# Patient Record
Sex: FEMALE | Race: WHITE | Employment: FULL TIME | URBAN - METROPOLITAN AREA
[De-identification: names, ages, dates, MRNs, and addresses within clinical notes are randomized per-mention and may not be internally consistent; named-entity substitution may affect disease eponyms.]

---

## 2018-04-24 ENCOUNTER — HOSPITAL ENCOUNTER (EMERGENCY)
Age: 38
Discharge: HOME | End: 2018-04-24
Attending: EMERGENCY MEDICINE
Payer: COMMERCIAL

## 2018-04-24 VITALS
BODY MASS INDEX: 31.55 KG/M2 | OXYGEN SATURATION: 98 % | HEART RATE: 110 BPM | WEIGHT: 213 LBS | HEIGHT: 69 IN | TEMPERATURE: 97.5 F | RESPIRATION RATE: 20 BRPM | DIASTOLIC BLOOD PRESSURE: 98 MMHG | SYSTOLIC BLOOD PRESSURE: 149 MMHG

## 2018-04-24 DIAGNOSIS — S39.012A STRAIN OF LUMBAR REGION, INITIAL ENCOUNTER: Primary | ICD-10-CM

## 2018-04-24 DIAGNOSIS — Z32.01 PREGNANCY TEST POSITIVE: ICD-10-CM

## 2018-04-24 LAB
-: ABNORMAL
AMORPHOUS: ABNORMAL
BACTERIA: ABNORMAL
BILIRUBIN URINE: NEGATIVE
CASTS UA: ABNORMAL /LPF (ref 0–8)
COLOR: YELLOW
CRYSTALS, UA: ABNORMAL /HPF
EPITHELIAL CELLS UA: ABNORMAL /HPF (ref 0–5)
GLUCOSE URINE: NEGATIVE
HCG(URINE) PREGNANCY TEST: POSITIVE
KETONES, URINE: ABNORMAL
LEUKOCYTE ESTERASE, URINE: ABNORMAL
MUCUS: ABNORMAL
NITRITE, URINE: NEGATIVE
OTHER OBSERVATIONS UA: ABNORMAL
PH UA: 5 (ref 5–8)
PROTEIN UA: NEGATIVE
RBC UA: ABNORMAL /HPF (ref 0–4)
RENAL EPITHELIAL, UA: ABNORMAL /HPF
SPECIFIC GRAVITY UA: 1.03 (ref 1–1.03)
TRICHOMONAS: ABNORMAL
TURBIDITY: ABNORMAL
URINE HGB: NEGATIVE
UROBILINOGEN, URINE: NORMAL
WBC UA: ABNORMAL /HPF (ref 0–5)
YEAST: ABNORMAL

## 2018-04-24 PROCEDURE — 81001 URINALYSIS AUTO W/SCOPE: CPT

## 2018-04-24 PROCEDURE — 99283 EMERGENCY DEPT VISIT LOW MDM: CPT

## 2018-04-24 PROCEDURE — 6370000000 HC RX 637 (ALT 250 FOR IP): Performed by: EMERGENCY MEDICINE

## 2018-04-24 PROCEDURE — 84703 CHORIONIC GONADOTROPIN ASSAY: CPT

## 2018-04-24 RX ORDER — NITROFURANTOIN 25; 75 MG/1; MG/1
100 CAPSULE ORAL ONCE
Status: COMPLETED | OUTPATIENT
Start: 2018-04-24 | End: 2018-04-24

## 2018-04-24 RX ORDER — FOLIC ACID, .BETA.-CAROTENE, ASCORBIC ACID, CHOLECALCIFEROL, .ALPHA.-TOCOPHEROL ACETATE, DL-, THIAMINE MONONITRATE, RIBOFLAVIN, NIACINAMIDE, PYRIDOXINE HYDROCHLORIDE, CYANOCOBALAMIN, CALCIUM PANTOTHENATE, CALCIUM CARBONATE, FERROUS FUMARATE, AND ZINC OXIDE 1; 1000; 100; 400; 30; 3; 3; 15; 20; 12; 7; 200; 29; 20 MG/1; [IU]/1; MG/1; [IU]/1; [IU]/1; MG/1; MG/1; MG/1; MG/1; UG/1; MG/1; MG/1; MG/1; MG/1
1 TABLET, CHEWABLE ORAL DAILY
Qty: 30 TABLET | Refills: 3 | Status: ON HOLD | OUTPATIENT
Start: 2018-04-24 | End: 2021-02-08

## 2018-04-24 RX ORDER — NITROFURANTOIN 25; 75 MG/1; MG/1
100 CAPSULE ORAL 2 TIMES DAILY
Qty: 10 CAPSULE | Refills: 0 | Status: SHIPPED | OUTPATIENT
Start: 2018-04-24 | End: 2018-04-29

## 2018-04-24 RX ADMIN — NITROFURANTOIN MONOHYDRATE AND NITROFURANTOIN MACROCRYSTALLINE 100 MG: 75; 25 CAPSULE ORAL at 22:29

## 2018-04-24 ASSESSMENT — PAIN DESCRIPTION - ORIENTATION: ORIENTATION: LEFT;LOWER

## 2018-04-24 ASSESSMENT — PAIN DESCRIPTION - DESCRIPTORS: DESCRIPTORS: DULL

## 2018-04-24 ASSESSMENT — PAIN SCALES - GENERAL: PAINLEVEL_OUTOF10: 6

## 2018-04-24 ASSESSMENT — PAIN DESCRIPTION - LOCATION: LOCATION: BACK

## 2018-07-03 LAB — PAP SMEAR: NORMAL

## 2019-02-12 ENCOUNTER — HOSPITAL ENCOUNTER (EMERGENCY)
Age: 39
Discharge: HOME | End: 2019-02-12
Attending: EMERGENCY MEDICINE
Payer: COMMERCIAL

## 2019-02-12 VITALS
WEIGHT: 224 LBS | OXYGEN SATURATION: 96 % | BODY MASS INDEX: 32.07 KG/M2 | RESPIRATION RATE: 16 BRPM | HEIGHT: 70 IN | HEART RATE: 118 BPM | SYSTOLIC BLOOD PRESSURE: 128 MMHG | TEMPERATURE: 98 F | DIASTOLIC BLOOD PRESSURE: 83 MMHG

## 2019-02-12 DIAGNOSIS — S39.012A STRAIN OF LUMBAR REGION, INITIAL ENCOUNTER: Primary | ICD-10-CM

## 2019-02-12 PROCEDURE — 96372 THER/PROPH/DIAG INJ SC/IM: CPT

## 2019-02-12 PROCEDURE — 99283 EMERGENCY DEPT VISIT LOW MDM: CPT

## 2019-02-12 PROCEDURE — 6360000002 HC RX W HCPCS: Performed by: PHYSICIAN ASSISTANT

## 2019-02-12 RX ORDER — KETOROLAC TROMETHAMINE 30 MG/ML
60 INJECTION, SOLUTION INTRAMUSCULAR; INTRAVENOUS ONCE
Status: COMPLETED | OUTPATIENT
Start: 2019-02-12 | End: 2019-02-12

## 2019-02-12 RX ORDER — TRAMADOL HYDROCHLORIDE 50 MG/1
50 TABLET ORAL EVERY 8 HOURS PRN
Qty: 9 TABLET | Refills: 0 | Status: SHIPPED | OUTPATIENT
Start: 2019-02-12 | End: 2019-02-15

## 2019-02-12 RX ORDER — PREDNISONE 20 MG/1
40 TABLET ORAL DAILY
Qty: 10 TABLET | Refills: 0 | Status: SHIPPED | OUTPATIENT
Start: 2019-02-12 | End: 2019-02-17

## 2019-02-12 RX ORDER — CYCLOBENZAPRINE HCL 10 MG
10 TABLET ORAL 2 TIMES DAILY PRN
Qty: 10 TABLET | Refills: 0 | Status: SHIPPED | OUTPATIENT
Start: 2019-02-12 | End: 2019-02-17

## 2019-02-12 RX ORDER — ORPHENADRINE CITRATE 30 MG/ML
60 INJECTION INTRAMUSCULAR; INTRAVENOUS ONCE
Status: COMPLETED | OUTPATIENT
Start: 2019-02-12 | End: 2019-02-12

## 2019-02-12 RX ADMIN — ORPHENADRINE CITRATE 60 MG: 30 INJECTION INTRAMUSCULAR; INTRAVENOUS at 19:02

## 2019-02-12 RX ADMIN — KETOROLAC TROMETHAMINE 60 MG: 30 INJECTION, SOLUTION INTRAMUSCULAR; INTRAVENOUS at 19:01

## 2019-02-12 ASSESSMENT — ENCOUNTER SYMPTOMS
BACK PAIN: 1
ABDOMINAL SWELLING: 0

## 2019-02-12 ASSESSMENT — PAIN DESCRIPTION - PAIN TYPE
TYPE: ACUTE PAIN
TYPE: ACUTE PAIN

## 2019-02-12 ASSESSMENT — PAIN DESCRIPTION - ORIENTATION
ORIENTATION: LOWER
ORIENTATION: LEFT

## 2019-02-12 ASSESSMENT — PAIN DESCRIPTION - LOCATION
LOCATION: BACK
LOCATION: BACK

## 2019-02-12 ASSESSMENT — PAIN DESCRIPTION - DESCRIPTORS: DESCRIPTORS: ACHING

## 2019-02-12 ASSESSMENT — PAIN SCALES - GENERAL
PAINLEVEL_OUTOF10: 8
PAINLEVEL_OUTOF10: 5
PAINLEVEL_OUTOF10: 8

## 2019-02-12 ASSESSMENT — PAIN DESCRIPTION - FREQUENCY: FREQUENCY: CONTINUOUS

## 2019-02-12 ASSESSMENT — PAIN DESCRIPTION - PROGRESSION: CLINICAL_PROGRESSION: GRADUALLY IMPROVING

## 2019-03-05 ENCOUNTER — HOSPITAL ENCOUNTER (OUTPATIENT)
Dept: GENERAL RADIOLOGY | Age: 39
Discharge: HOME | End: 2019-03-07
Payer: COMMERCIAL

## 2019-03-05 ENCOUNTER — HOSPITAL ENCOUNTER (OUTPATIENT)
Age: 39
Discharge: HOME | End: 2019-03-07
Payer: COMMERCIAL

## 2019-03-05 ENCOUNTER — OFFICE VISIT (OUTPATIENT)
Dept: PRIMARY CARE CLINIC | Age: 39
End: 2019-03-05
Payer: COMMERCIAL

## 2019-03-05 VITALS
TEMPERATURE: 98.2 F | DIASTOLIC BLOOD PRESSURE: 95 MMHG | OXYGEN SATURATION: 99 % | SYSTOLIC BLOOD PRESSURE: 142 MMHG | BODY MASS INDEX: 32.83 KG/M2 | HEART RATE: 84 BPM | WEIGHT: 228.8 LBS

## 2019-03-05 DIAGNOSIS — M54.50 CHRONIC MIDLINE LOW BACK PAIN WITHOUT SCIATICA: ICD-10-CM

## 2019-03-05 DIAGNOSIS — R11.10 DRY HEAVES: ICD-10-CM

## 2019-03-05 DIAGNOSIS — M54.50 CHRONIC MIDLINE LOW BACK PAIN WITHOUT SCIATICA: Primary | ICD-10-CM

## 2019-03-05 DIAGNOSIS — Z78.9: ICD-10-CM

## 2019-03-05 DIAGNOSIS — Z01.84 IMMUNITY STATUS TESTING: ICD-10-CM

## 2019-03-05 DIAGNOSIS — Z71.6 TOBACCO ABUSE COUNSELING: ICD-10-CM

## 2019-03-05 DIAGNOSIS — G89.29 CHRONIC MIDLINE LOW BACK PAIN WITHOUT SCIATICA: ICD-10-CM

## 2019-03-05 DIAGNOSIS — Z72.0 TOBACCO USE: ICD-10-CM

## 2019-03-05 DIAGNOSIS — E66.09 CLASS 1 OBESITY DUE TO EXCESS CALORIES WITH BODY MASS INDEX (BMI) OF 32.0 TO 32.9 IN ADULT, UNSPECIFIED WHETHER SERIOUS COMORBIDITY PRESENT: ICD-10-CM

## 2019-03-05 DIAGNOSIS — Z76.89 ENCOUNTER TO ESTABLISH CARE: ICD-10-CM

## 2019-03-05 DIAGNOSIS — G89.29 CHRONIC MIDLINE LOW BACK PAIN WITHOUT SCIATICA: Primary | ICD-10-CM

## 2019-03-05 PROCEDURE — G8417 CALC BMI ABV UP PARAM F/U: HCPCS | Performed by: PHYSICIAN ASSISTANT

## 2019-03-05 PROCEDURE — 72100 X-RAY EXAM L-S SPINE 2/3 VWS: CPT

## 2019-03-05 PROCEDURE — G8484 FLU IMMUNIZE NO ADMIN: HCPCS | Performed by: PHYSICIAN ASSISTANT

## 2019-03-05 PROCEDURE — 4004F PT TOBACCO SCREEN RCVD TLK: CPT | Performed by: PHYSICIAN ASSISTANT

## 2019-03-05 PROCEDURE — G8427 DOCREV CUR MEDS BY ELIG CLIN: HCPCS | Performed by: PHYSICIAN ASSISTANT

## 2019-03-05 PROCEDURE — 99214 OFFICE O/P EST MOD 30 MIN: CPT | Performed by: PHYSICIAN ASSISTANT

## 2019-03-05 SDOH — HEALTH STABILITY: MENTAL HEALTH: HOW OFTEN DO YOU HAVE A DRINK CONTAINING ALCOHOL?: 2-4 TIMES A MONTH

## 2019-03-05 ASSESSMENT — ENCOUNTER SYMPTOMS
VOMITING: 0
BACK PAIN: 1
NAUSEA: 0

## 2019-03-05 ASSESSMENT — PATIENT HEALTH QUESTIONNAIRE - PHQ9
SUM OF ALL RESPONSES TO PHQ QUESTIONS 1-9: 0
SUM OF ALL RESPONSES TO PHQ9 QUESTIONS 1 & 2: 0
SUM OF ALL RESPONSES TO PHQ QUESTIONS 1-9: 0
2. FEELING DOWN, DEPRESSED OR HOPELESS: 0
1. LITTLE INTEREST OR PLEASURE IN DOING THINGS: 0

## 2019-03-12 ENCOUNTER — HOSPITAL ENCOUNTER (OUTPATIENT)
Dept: PHYSICAL THERAPY | Age: 39
Setting detail: THERAPIES SERIES
Discharge: HOME | End: 2019-03-12
Payer: COMMERCIAL

## 2019-03-12 PROCEDURE — 97530 THERAPEUTIC ACTIVITIES: CPT

## 2019-03-12 PROCEDURE — 97110 THERAPEUTIC EXERCISES: CPT

## 2019-03-12 PROCEDURE — 97161 PT EVAL LOW COMPLEX 20 MIN: CPT

## 2019-03-14 ENCOUNTER — TELEPHONE (OUTPATIENT)
Dept: PRIMARY CARE CLINIC | Age: 39
End: 2019-03-14

## 2019-03-14 ENCOUNTER — HOSPITAL ENCOUNTER (OUTPATIENT)
Dept: PHYSICAL THERAPY | Age: 39
Setting detail: THERAPIES SERIES
Discharge: HOME | End: 2019-03-14
Payer: COMMERCIAL

## 2019-03-14 PROBLEM — Z78.9 CONSUMES ALCOHOL AT SOCIAL EVENTS: Status: ACTIVE | Noted: 2019-03-14

## 2019-03-14 PROBLEM — G89.29 CHRONIC MIDLINE LOW BACK PAIN WITHOUT SCIATICA: Status: ACTIVE | Noted: 2019-03-14

## 2019-03-14 PROBLEM — R11.10 DRY HEAVES: Status: ACTIVE | Noted: 2019-03-14

## 2019-03-14 PROBLEM — Z72.0 TOBACCO USE: Status: ACTIVE | Noted: 2019-03-14

## 2019-03-14 PROBLEM — Z71.6 TOBACCO ABUSE COUNSELING: Status: ACTIVE | Noted: 2019-03-14

## 2019-03-14 PROBLEM — M54.50 CHRONIC MIDLINE LOW BACK PAIN WITHOUT SCIATICA: Status: ACTIVE | Noted: 2019-03-14

## 2019-03-14 PROBLEM — E66.09 CLASS 1 OBESITY DUE TO EXCESS CALORIES WITH BODY MASS INDEX (BMI) OF 32.0 TO 32.9 IN ADULT: Status: ACTIVE | Noted: 2019-03-14

## 2019-03-14 PROCEDURE — 97110 THERAPEUTIC EXERCISES: CPT

## 2019-03-14 ASSESSMENT — ENCOUNTER SYMPTOMS
CONSTIPATION: 0
SHORTNESS OF BREATH: 0
WHEEZING: 0
SORE THROAT: 0
COUGH: 0
RHINORRHEA: 0
DIARRHEA: 0
ABDOMINAL PAIN: 0

## 2019-03-21 ENCOUNTER — HOSPITAL ENCOUNTER (OUTPATIENT)
Dept: PHYSICAL THERAPY | Age: 39
Setting detail: THERAPIES SERIES
Discharge: HOME | End: 2019-03-21
Payer: COMMERCIAL

## 2019-03-21 PROCEDURE — 97110 THERAPEUTIC EXERCISES: CPT

## 2019-03-21 PROCEDURE — 97140 MANUAL THERAPY 1/> REGIONS: CPT

## 2019-03-26 ENCOUNTER — APPOINTMENT (OUTPATIENT)
Dept: PHYSICAL THERAPY | Age: 39
End: 2019-03-26
Payer: COMMERCIAL

## 2019-03-28 ENCOUNTER — HOSPITAL ENCOUNTER (OUTPATIENT)
Dept: PHYSICAL THERAPY | Age: 39
Setting detail: THERAPIES SERIES
Discharge: HOME | End: 2019-03-28
Payer: COMMERCIAL

## 2019-03-28 ENCOUNTER — APPOINTMENT (OUTPATIENT)
Dept: PHYSICAL THERAPY | Age: 39
End: 2019-03-28
Payer: COMMERCIAL

## 2019-03-28 PROCEDURE — 97110 THERAPEUTIC EXERCISES: CPT

## 2019-03-28 PROCEDURE — 97140 MANUAL THERAPY 1/> REGIONS: CPT

## 2019-03-29 ENCOUNTER — HOSPITAL ENCOUNTER (OUTPATIENT)
Dept: PHYSICAL THERAPY | Age: 39
Setting detail: THERAPIES SERIES
Discharge: HOME | End: 2019-03-29
Payer: COMMERCIAL

## 2019-03-29 ENCOUNTER — OFFICE VISIT (OUTPATIENT)
Dept: PRIMARY CARE CLINIC | Age: 39
End: 2019-03-29
Payer: COMMERCIAL

## 2019-03-29 VITALS
BODY MASS INDEX: 32.93 KG/M2 | WEIGHT: 230 LBS | RESPIRATION RATE: 20 BRPM | HEIGHT: 70 IN | DIASTOLIC BLOOD PRESSURE: 90 MMHG | SYSTOLIC BLOOD PRESSURE: 135 MMHG | OXYGEN SATURATION: 95 % | HEART RATE: 85 BPM

## 2019-03-29 DIAGNOSIS — B02.9 HERPES ZOSTER WITHOUT COMPLICATION: Primary | ICD-10-CM

## 2019-03-29 DIAGNOSIS — R03.0 ELEVATED BLOOD PRESSURE READING: ICD-10-CM

## 2019-03-29 PROCEDURE — G8484 FLU IMMUNIZE NO ADMIN: HCPCS | Performed by: PHYSICIAN ASSISTANT

## 2019-03-29 PROCEDURE — 97110 THERAPEUTIC EXERCISES: CPT

## 2019-03-29 PROCEDURE — 99214 OFFICE O/P EST MOD 30 MIN: CPT | Performed by: PHYSICIAN ASSISTANT

## 2019-03-29 PROCEDURE — 4004F PT TOBACCO SCREEN RCVD TLK: CPT | Performed by: PHYSICIAN ASSISTANT

## 2019-03-29 PROCEDURE — G8427 DOCREV CUR MEDS BY ELIG CLIN: HCPCS | Performed by: PHYSICIAN ASSISTANT

## 2019-03-29 PROCEDURE — G8417 CALC BMI ABV UP PARAM F/U: HCPCS | Performed by: PHYSICIAN ASSISTANT

## 2019-03-29 RX ORDER — GABAPENTIN 100 MG/1
100 CAPSULE ORAL 2 TIMES DAILY
Qty: 28 CAPSULE | Refills: 0 | Status: ON HOLD | OUTPATIENT
Start: 2019-03-29 | End: 2021-02-08 | Stop reason: ALTCHOICE

## 2019-03-29 RX ORDER — VALACYCLOVIR HYDROCHLORIDE 500 MG/1
500 TABLET, FILM COATED ORAL 2 TIMES DAILY
Qty: 28 TABLET | Refills: 0 | Status: SHIPPED | OUTPATIENT
Start: 2019-03-29 | End: 2019-04-12

## 2019-03-29 NOTE — PROGRESS NOTES
[facility]  [address]  [address]  Dept: [phone]  Dept Fax: [phone]    Office Progress/Follow Up Note  Date of patient's visit: 3/29/2019  Patient's Name:  Antonio Fajardo YOB: 1980            Patient Care Team:  Umberto Raimrez PA-C as PCP - General (Physician Assistant)  ================================================================    REASON FOR VISIT/CHIEF COMPLAINT:  Rash (that look like welts on back and chest x 3 weeks, painful)    HISTORY OF PRESENTING ILLNESS:  History was obtained from: patient. Antonio Fajardo is a 37 y.o. is here c/o shingles. Pt states notice rash across back and Rt breast. Discuss  Shingles, medications in depth with pt, informed pt she will be prescribed antiviral and neuropathy mdeicaiton, encourage pt to keep rash covered to avoided infecting anyone else. Patient blood pressure is elevated in office. Patient is afebrile in office. Medications reviewed, prescribed Valtrex 500 mg twice a day for 7 days, gabapentin 100 mg twice a day for 7 days. Instructed patient to contact office if shingles do not improve and resolve after medication treatment. Rash    This is a new problem. The current episode started 1 to 4 weeks ago. The affected locations include the back and chest.  The rash is characterized by redness and burning. She was exposed to nothing. Pertinent negatives include no congestion, cough, diarrhea, fever, shortness of breath or vomiting. Past treatments include nothing. The treatment provided no relief.        Patient Active Problem List   Diagnosis    Smoker    Family planning    Chronic midline low back pain without sciatica    Class 1 obesity due to excess calories with body mass index (BMI) of 32.0 to 32.9 in adult    Tobacco use    Tobacco abuse counseling    Dry heaves    Consumes alcohol at social events       Health Maintenance Due Topic Date Due    Pneumococcal 0-64 years Vaccine (1 of 1 - PPSV23) 03/12/1984    HIV screen  03/12/1993    Lipid screen  03/12/2018    Diabetes screen  03/12/2018       No Known Allergies      Current Outpatient Medications   Medication Sig Dispense Refill    gabapentin (NEURONTIN) 100 MG capsule Take 1 capsule by mouth 2 times daily for 14 days. 28 capsule 0    Prenatal Vit-Fe Fumarate-FA (PRENATAL 19) 29-1 MG CHEW Take 1 Dose by mouth daily 30 tablet 3    Sodium Chloride-Sodium Bicarb (NETI POT SINUS WASH) 2300-700 MG KIT As per instructions 1-2 times a day 1 kit 0    naproxen (NAPROSYN) 500 MG tablet Take 1 tablet by mouth 2 times daily (with meals). 60 tablet 3    ibuprofen (ADVIL;MOTRIN) 600 MG tablet Take 1 tablet by mouth every 6 hours as needed for Pain. 30 tablet 0    medroxyPROGESTERone (DEPO-PROVERA) 150 MG/ML injection Inject 1 mL into the muscle once for 1 dose. 1 mL 3     Current Facility-Administered Medications   Medication Dose Route Frequency Provider Last Rate Last Dose    medroxyPROGESTERone (DEPO-PROVERA) injection 150 mg  150 mg Intramuscular See Admin Instructions Malaika Boxer, MD   150 mg at 09/16/13 1349       Social History     Tobacco Use    Smoking status: Current Every Day Smoker     Packs/day: 0.50     Years: 25.00     Pack years: 12.50     Types: Cigarettes     Start date: 3/5/1994    Smokeless tobacco: Never Used    Tobacco comment: 1/2 pack daily as of 3/5/19   Substance Use Topics    Alcohol use:  Yes     Alcohol/week: 0.5 oz     Types: 1 Standard drinks or equivalent per week     Frequency: 2-4 times a month     Comment: 8-12 beers on Saurday as of 3/    Drug use: Yes     Types: Marijuana       Family History   Problem Relation Age of Onset    Colon Cancer Maternal Grandfather         60-70s    Breast Cancer Other         maternal great aunt age of onset uncertain    High Cholesterol Mother     Lung Cancer Father     Cervical Cancer Maternal Grandmother     Emphysema Paternal Grandmother     Mult Sclerosis Maternal Aunt     Cancer Neg Hx     Diabetes Neg Hx     Eclampsia Neg Hx     Hypertension Neg Hx     Ovarian Cancer Neg Hx      Labor Neg Hx     Spont Abortions Neg Hx     Stroke Neg Hx         REVIEW OFSYSTEMS:  Review of Systems   Constitutional: Negative for chills and fever. HENT: Negative for congestion. Respiratory: Negative for cough, shortness of breath and wheezing. Cardiovascular: Negative for chest pain. Gastrointestinal: Negative for constipation, diarrhea, nausea and vomiting. Genitourinary: Negative for dysuria, frequency and urgency. Musculoskeletal: Negative for back pain, myalgias and neck pain. Skin: Positive for rash. Neurological: Negative for headaches. PHYSICAL EXAM:  Vitals:    19 1130   BP: (!) 135/90   Site: Left Upper Arm   Position: Sitting   Cuff Size: Large Adult   Pulse: 85   Resp: 20   SpO2: 95%   Weight: 230 lb (104.3 kg)   Height: 5' 10\" (1.778 m)     BP Readings from Last 3 Encounters:   19 (!) 135/90   19 (!) 142/95   19 128/83        Physical Exam   Constitutional: She is oriented to person, place, and time. She appears well-developed and well-nourished. She is cooperative. HENT:   Head: Normocephalic and atraumatic. Right Ear: External ear normal.   Left Ear: External ear normal.   Nose: Nose normal.   Eyes: Pupils are equal, round, and reactive to light. Conjunctivae and lids are normal.   Cardiovascular: Normal rate, regular rhythm and normal heart sounds. Pulmonary/Chest: Effort normal and breath sounds normal.   Abdominal: Soft. She exhibits no mass. There is no tenderness. Musculoskeletal: She exhibits no tenderness. Neurological: She is alert and oriented to person, place, and time. Skin: Skin is warm and dry. Rash noted. Rash is vesicular.          DIAGNOSTIC FINDINGS:  CBC:No results found for: WBC, HGB, PLT    BMP:  No results

## 2019-04-01 ENCOUNTER — HOSPITAL ENCOUNTER (OUTPATIENT)
Dept: PHYSICAL THERAPY | Age: 39
Setting detail: THERAPIES SERIES
Discharge: HOME | End: 2019-04-01
Payer: COMMERCIAL

## 2019-04-14 ASSESSMENT — ENCOUNTER SYMPTOMS
SHORTNESS OF BREATH: 0
BACK PAIN: 0
WHEEZING: 0
NAUSEA: 0
VOMITING: 0
CONSTIPATION: 0
DIARRHEA: 0
COUGH: 0

## 2021-02-08 ENCOUNTER — APPOINTMENT (OUTPATIENT)
Dept: GENERAL RADIOLOGY | Age: 41
DRG: 313 | End: 2021-02-08
Payer: COMMERCIAL

## 2021-02-08 ENCOUNTER — ANESTHESIA (OUTPATIENT)
Dept: OPERATING ROOM | Age: 41
DRG: 313 | End: 2021-02-08
Payer: COMMERCIAL

## 2021-02-08 ENCOUNTER — APPOINTMENT (OUTPATIENT)
Dept: CT IMAGING | Age: 41
DRG: 313 | End: 2021-02-08
Payer: COMMERCIAL

## 2021-02-08 ENCOUNTER — ANESTHESIA EVENT (OUTPATIENT)
Dept: OPERATING ROOM | Age: 41
DRG: 313 | End: 2021-02-08
Payer: COMMERCIAL

## 2021-02-08 ENCOUNTER — HOSPITAL ENCOUNTER (INPATIENT)
Age: 41
LOS: 1 days | Discharge: HOME | DRG: 313 | End: 2021-02-09
Attending: STUDENT IN AN ORGANIZED HEALTH CARE EDUCATION/TRAINING PROGRAM | Admitting: PODIATRIST
Payer: COMMERCIAL

## 2021-02-08 VITALS
TEMPERATURE: 98.4 F | OXYGEN SATURATION: 100 % | DIASTOLIC BLOOD PRESSURE: 48 MMHG | RESPIRATION RATE: 15 BRPM | SYSTOLIC BLOOD PRESSURE: 82 MMHG

## 2021-02-08 DIAGNOSIS — G89.18 POST-OP PAIN: ICD-10-CM

## 2021-02-08 DIAGNOSIS — S82.892A CLOSED FRACTURE OF LEFT ANKLE, INITIAL ENCOUNTER: Primary | ICD-10-CM

## 2021-02-08 PROBLEM — Z78.9 ALCOHOL USE: Status: ACTIVE | Noted: 2021-02-08

## 2021-02-08 PROBLEM — S82.842A BIMALLEOLAR ANKLE FRACTURE, LEFT, CLOSED, INITIAL ENCOUNTER: Status: ACTIVE | Noted: 2021-02-08

## 2021-02-08 LAB
ABSOLUTE EOS #: 0.2 K/UL (ref 0–0.4)
ABSOLUTE IMMATURE GRANULOCYTE: ABNORMAL K/UL (ref 0–0.3)
ABSOLUTE LYMPH #: 1.4 K/UL (ref 1–4.8)
ABSOLUTE MONO #: 0.5 K/UL (ref 0.1–1.3)
ANION GAP SERPL CALCULATED.3IONS-SCNC: 13 MMOL/L (ref 9–17)
BASOPHILS # BLD: 1 % (ref 0–2)
BASOPHILS ABSOLUTE: 0.1 K/UL (ref 0–0.2)
BUN BLDV-MCNC: 10 MG/DL (ref 6–20)
BUN/CREAT BLD: ABNORMAL (ref 9–20)
CALCIUM SERPL-MCNC: 9.1 MG/DL (ref 8.6–10.4)
CHLORIDE BLD-SCNC: 108 MMOL/L (ref 98–107)
CO2: 21 MMOL/L (ref 20–31)
CREAT SERPL-MCNC: 0.53 MG/DL (ref 0.5–0.9)
DIFFERENTIAL TYPE: ABNORMAL
EOSINOPHILS RELATIVE PERCENT: 3 % (ref 0–4)
GFR AFRICAN AMERICAN: >60 ML/MIN
GFR NON-AFRICAN AMERICAN: >60 ML/MIN
GFR SERPL CREATININE-BSD FRML MDRD: ABNORMAL ML/MIN/{1.73_M2}
GFR SERPL CREATININE-BSD FRML MDRD: ABNORMAL ML/MIN/{1.73_M2}
GLUCOSE BLD-MCNC: 108 MG/DL (ref 70–99)
HCG QUANTITATIVE: <1 IU/L
HCT VFR BLD CALC: 41.4 % (ref 36–46)
HEMOGLOBIN: 13.8 G/DL (ref 12–16)
IMMATURE GRANULOCYTES: ABNORMAL %
INR BLD: 0.9
LYMPHOCYTES # BLD: 15 % (ref 24–44)
MCH RBC QN AUTO: 31.1 PG (ref 26–34)
MCHC RBC AUTO-ENTMCNC: 33.3 G/DL (ref 31–37)
MCV RBC AUTO: 93.3 FL (ref 80–100)
MONOCYTES # BLD: 5 % (ref 1–7)
NRBC AUTOMATED: ABNORMAL PER 100 WBC
PARTIAL THROMBOPLASTIN TIME: 30 SEC (ref 24–36)
PDW BLD-RTO: 12.5 % (ref 11.5–14.9)
PLATELET # BLD: 357 K/UL (ref 150–450)
PLATELET ESTIMATE: ABNORMAL
PMV BLD AUTO: 7.6 FL (ref 6–12)
POTASSIUM SERPL-SCNC: 4 MMOL/L (ref 3.7–5.3)
PROTHROMBIN TIME: 12.5 SEC (ref 11.8–14.6)
RBC # BLD: 4.44 M/UL (ref 4–5.2)
RBC # BLD: ABNORMAL 10*6/UL
SARS-COV-2, RAPID: NOT DETECTED
SARS-COV-2: NORMAL
SARS-COV-2: NORMAL
SEG NEUTROPHILS: 76 % (ref 36–66)
SEGMENTED NEUTROPHILS ABSOLUTE COUNT: 7.2 K/UL (ref 1.3–9.1)
SODIUM BLD-SCNC: 142 MMOL/L (ref 135–144)
SOURCE: NORMAL
WBC # BLD: 9.5 K/UL (ref 3.5–11)
WBC # BLD: ABNORMAL 10*3/UL

## 2021-02-08 PROCEDURE — 3600000012 HC SURGERY LEVEL 2 ADDTL 15MIN: Performed by: PODIATRIST

## 2021-02-08 PROCEDURE — 76942 ECHO GUIDE FOR BIOPSY: CPT | Performed by: ANESTHESIOLOGY

## 2021-02-08 PROCEDURE — 6360000002 HC RX W HCPCS: Performed by: STUDENT IN AN ORGANIZED HEALTH CARE EDUCATION/TRAINING PROGRAM

## 2021-02-08 PROCEDURE — 2720000010 HC SURG SUPPLY STERILE: Performed by: PODIATRIST

## 2021-02-08 PROCEDURE — 36415 COLL VENOUS BLD VENIPUNCTURE: CPT

## 2021-02-08 PROCEDURE — 3600000002 HC SURGERY LEVEL 2 BASE: Performed by: PODIATRIST

## 2021-02-08 PROCEDURE — 3700000000 HC ANESTHESIA ATTENDED CARE: Performed by: PODIATRIST

## 2021-02-08 PROCEDURE — 6360000002 HC RX W HCPCS: Performed by: ANESTHESIOLOGY

## 2021-02-08 PROCEDURE — 1200000000 HC SEMI PRIVATE

## 2021-02-08 PROCEDURE — 96374 THER/PROPH/DIAG INJ IV PUSH: CPT

## 2021-02-08 PROCEDURE — 2500000003 HC RX 250 WO HCPCS: Performed by: PODIATRIST

## 2021-02-08 PROCEDURE — 73610 X-RAY EXAM OF ANKLE: CPT

## 2021-02-08 PROCEDURE — 0QSH35Z REPOSITION LEFT TIBIA WITH EXTERNAL FIXATION DEVICE, PERCUTANEOUS APPROACH: ICD-10-PCS | Performed by: PODIATRIST

## 2021-02-08 PROCEDURE — 85610 PROTHROMBIN TIME: CPT

## 2021-02-08 PROCEDURE — 2580000003 HC RX 258: Performed by: STUDENT IN AN ORGANIZED HEALTH CARE EDUCATION/TRAINING PROGRAM

## 2021-02-08 PROCEDURE — 0QSM35Z REPOSITION LEFT TARSAL WITH EXTERNAL FIXATION DEVICE, PERCUTANEOUS APPROACH: ICD-10-PCS | Performed by: PODIATRIST

## 2021-02-08 PROCEDURE — 80048 BASIC METABOLIC PNL TOTAL CA: CPT

## 2021-02-08 PROCEDURE — 99284 EMERGENCY DEPT VISIT MOD MDM: CPT

## 2021-02-08 PROCEDURE — 7100000000 HC PACU RECOVERY - FIRST 15 MIN: Performed by: PODIATRIST

## 2021-02-08 PROCEDURE — 27840 TREAT ANKLE DISLOCATION: CPT

## 2021-02-08 PROCEDURE — 73700 CT LOWER EXTREMITY W/O DYE: CPT

## 2021-02-08 PROCEDURE — 6370000000 HC RX 637 (ALT 250 FOR IP): Performed by: STUDENT IN AN ORGANIZED HEALTH CARE EDUCATION/TRAINING PROGRAM

## 2021-02-08 PROCEDURE — 73630 X-RAY EXAM OF FOOT: CPT

## 2021-02-08 PROCEDURE — C9290 INJ, BUPIVACAINE LIPOSOME: HCPCS | Performed by: ANESTHESIOLOGY

## 2021-02-08 PROCEDURE — U0002 COVID-19 LAB TEST NON-CDC: HCPCS

## 2021-02-08 PROCEDURE — 3209999900 FLUORO FOR SURGICAL PROCEDURES

## 2021-02-08 PROCEDURE — 3700000001 HC ADD 15 MINUTES (ANESTHESIA): Performed by: PODIATRIST

## 2021-02-08 PROCEDURE — C1713 ANCHOR/SCREW BN/BN,TIS/BN: HCPCS | Performed by: PODIATRIST

## 2021-02-08 PROCEDURE — 2500000003 HC RX 250 WO HCPCS: Performed by: ANESTHESIOLOGY

## 2021-02-08 PROCEDURE — 84702 CHORIONIC GONADOTROPIN TEST: CPT

## 2021-02-08 PROCEDURE — 85730 THROMBOPLASTIN TIME PARTIAL: CPT

## 2021-02-08 PROCEDURE — 2580000003 HC RX 258: Performed by: ANESTHESIOLOGY

## 2021-02-08 PROCEDURE — 7100000001 HC PACU RECOVERY - ADDTL 15 MIN: Performed by: PODIATRIST

## 2021-02-08 PROCEDURE — 85025 COMPLETE CBC W/AUTO DIFF WBC: CPT

## 2021-02-08 PROCEDURE — 73590 X-RAY EXAM OF LOWER LEG: CPT

## 2021-02-08 PROCEDURE — 2709999900 HC NON-CHARGEABLE SUPPLY: Performed by: PODIATRIST

## 2021-02-08 RX ORDER — ONDANSETRON 2 MG/ML
4 INJECTION INTRAMUSCULAR; INTRAVENOUS
Status: COMPLETED | OUTPATIENT
Start: 2021-02-08 | End: 2021-02-08

## 2021-02-08 RX ORDER — OXYCODONE HYDROCHLORIDE AND ACETAMINOPHEN 5; 325 MG/1; MG/1
2 TABLET ORAL EVERY 6 HOURS PRN
Status: DISCONTINUED | OUTPATIENT
Start: 2021-02-08 | End: 2021-02-09 | Stop reason: HOSPADM

## 2021-02-08 RX ORDER — MORPHINE SULFATE 2 MG/ML
2 INJECTION, SOLUTION INTRAMUSCULAR; INTRAVENOUS EVERY 5 MIN PRN
Status: DISCONTINUED | OUTPATIENT
Start: 2021-02-08 | End: 2021-02-08 | Stop reason: HOSPADM

## 2021-02-08 RX ORDER — ACETAMINOPHEN 325 MG/1
650 TABLET ORAL EVERY 6 HOURS PRN
Status: DISCONTINUED | OUTPATIENT
Start: 2021-02-08 | End: 2021-02-09 | Stop reason: HOSPADM

## 2021-02-08 RX ORDER — MEPERIDINE HYDROCHLORIDE 25 MG/ML
12.5 INJECTION INTRAMUSCULAR; INTRAVENOUS; SUBCUTANEOUS EVERY 5 MIN PRN
Status: DISCONTINUED | OUTPATIENT
Start: 2021-02-08 | End: 2021-02-08 | Stop reason: HOSPADM

## 2021-02-08 RX ORDER — ACETAMINOPHEN 650 MG/1
650 SUPPOSITORY RECTAL EVERY 6 HOURS PRN
Status: DISCONTINUED | OUTPATIENT
Start: 2021-02-08 | End: 2021-02-09 | Stop reason: HOSPADM

## 2021-02-08 RX ORDER — PROPOFOL 10 MG/ML
INJECTION, EMULSION INTRAVENOUS PRN
Status: DISCONTINUED | OUTPATIENT
Start: 2021-02-08 | End: 2021-02-08 | Stop reason: SDUPTHER

## 2021-02-08 RX ORDER — BUPIVACAINE HYDROCHLORIDE 5 MG/ML
INJECTION, SOLUTION EPIDURAL; INTRACAUDAL PRN
Status: DISCONTINUED | OUTPATIENT
Start: 2021-02-08 | End: 2021-02-08 | Stop reason: ALTCHOICE

## 2021-02-08 RX ORDER — LABETALOL HYDROCHLORIDE 5 MG/ML
5 INJECTION, SOLUTION INTRAVENOUS EVERY 10 MIN PRN
Status: DISCONTINUED | OUTPATIENT
Start: 2021-02-08 | End: 2021-02-08 | Stop reason: HOSPADM

## 2021-02-08 RX ORDER — MIDAZOLAM HYDROCHLORIDE 1 MG/ML
INJECTION INTRAMUSCULAR; INTRAVENOUS PRN
Status: DISCONTINUED | OUTPATIENT
Start: 2021-02-08 | End: 2021-02-08 | Stop reason: SDUPTHER

## 2021-02-08 RX ORDER — MEDROXYPROGESTERONE ACETATE 150 MG/ML
150 INJECTION, SUSPENSION INTRAMUSCULAR ONCE
Status: DISCONTINUED | OUTPATIENT
Start: 2021-02-08 | End: 2021-02-08

## 2021-02-08 RX ORDER — NICOTINE 21 MG/24HR
1 PATCH, TRANSDERMAL 24 HOURS TRANSDERMAL ONCE
Status: COMPLETED | OUTPATIENT
Start: 2021-02-08 | End: 2021-02-09

## 2021-02-08 RX ORDER — PROMETHAZINE HYDROCHLORIDE 25 MG/1
12.5 TABLET ORAL EVERY 6 HOURS PRN
Status: DISCONTINUED | OUTPATIENT
Start: 2021-02-08 | End: 2021-02-09 | Stop reason: HOSPADM

## 2021-02-08 RX ORDER — SODIUM CHLORIDE 0.9 % (FLUSH) 0.9 %
10 SYRINGE (ML) INJECTION PRN
Status: DISCONTINUED | OUTPATIENT
Start: 2021-02-08 | End: 2021-02-09 | Stop reason: HOSPADM

## 2021-02-08 RX ORDER — FENTANYL CITRATE 50 UG/ML
100 INJECTION, SOLUTION INTRAMUSCULAR; INTRAVENOUS ONCE
Status: COMPLETED | OUTPATIENT
Start: 2021-02-08 | End: 2021-02-08

## 2021-02-08 RX ORDER — NICOTINE 21 MG/24HR
1 PATCH, TRANSDERMAL 24 HOURS TRANSDERMAL DAILY
Status: DISCONTINUED | OUTPATIENT
Start: 2021-02-08 | End: 2021-02-08 | Stop reason: SDUPTHER

## 2021-02-08 RX ORDER — MORPHINE SULFATE 4 MG/ML
2 INJECTION, SOLUTION INTRAMUSCULAR; INTRAVENOUS EVERY 4 HOURS PRN
Status: DISCONTINUED | OUTPATIENT
Start: 2021-02-08 | End: 2021-02-09 | Stop reason: HOSPADM

## 2021-02-08 RX ORDER — LIDOCAINE HYDROCHLORIDE 10 MG/ML
INJECTION, SOLUTION EPIDURAL; INFILTRATION; INTRACAUDAL; PERINEURAL PRN
Status: DISCONTINUED | OUTPATIENT
Start: 2021-02-08 | End: 2021-02-08 | Stop reason: SDUPTHER

## 2021-02-08 RX ORDER — GABAPENTIN 100 MG/1
100 CAPSULE ORAL 2 TIMES DAILY
Status: DISCONTINUED | OUTPATIENT
Start: 2021-02-08 | End: 2021-02-09 | Stop reason: HOSPADM

## 2021-02-08 RX ORDER — SWAB
1 SWAB, NON-MEDICATED MISCELLANEOUS DAILY
Status: DISCONTINUED | OUTPATIENT
Start: 2021-02-08 | End: 2021-02-09 | Stop reason: HOSPADM

## 2021-02-08 RX ORDER — KETOROLAC TROMETHAMINE 30 MG/ML
15 INJECTION, SOLUTION INTRAMUSCULAR; INTRAVENOUS EVERY 6 HOURS
Status: DISCONTINUED | OUTPATIENT
Start: 2021-02-08 | End: 2021-02-09 | Stop reason: HOSPADM

## 2021-02-08 RX ORDER — BUPIVACAINE HYDROCHLORIDE 5 MG/ML
INJECTION, SOLUTION EPIDURAL; INTRACAUDAL
Status: DISCONTINUED | OUTPATIENT
Start: 2021-02-08 | End: 2021-02-08 | Stop reason: SDUPTHER

## 2021-02-08 RX ORDER — ONDANSETRON 2 MG/ML
4 INJECTION INTRAMUSCULAR; INTRAVENOUS EVERY 6 HOURS PRN
Status: DISCONTINUED | OUTPATIENT
Start: 2021-02-08 | End: 2021-02-09 | Stop reason: HOSPADM

## 2021-02-08 RX ORDER — POLYETHYLENE GLYCOL 3350 17 G/17G
17 POWDER, FOR SOLUTION ORAL DAILY PRN
Status: DISCONTINUED | OUTPATIENT
Start: 2021-02-08 | End: 2021-02-09 | Stop reason: HOSPADM

## 2021-02-08 RX ORDER — OXYCODONE HYDROCHLORIDE AND ACETAMINOPHEN 5; 325 MG/1; MG/1
1 TABLET ORAL EVERY 6 HOURS PRN
Status: DISCONTINUED | OUTPATIENT
Start: 2021-02-08 | End: 2021-02-09 | Stop reason: HOSPADM

## 2021-02-08 RX ORDER — DIPHENHYDRAMINE HYDROCHLORIDE 50 MG/ML
12.5 INJECTION INTRAMUSCULAR; INTRAVENOUS
Status: DISCONTINUED | OUTPATIENT
Start: 2021-02-08 | End: 2021-02-08 | Stop reason: HOSPADM

## 2021-02-08 RX ORDER — FENTANYL CITRATE 50 UG/ML
INJECTION, SOLUTION INTRAMUSCULAR; INTRAVENOUS PRN
Status: DISCONTINUED | OUTPATIENT
Start: 2021-02-08 | End: 2021-02-08 | Stop reason: SDUPTHER

## 2021-02-08 RX ORDER — SODIUM CHLORIDE 0.9 % (FLUSH) 0.9 %
10 SYRINGE (ML) INJECTION EVERY 12 HOURS SCHEDULED
Status: DISCONTINUED | OUTPATIENT
Start: 2021-02-08 | End: 2021-02-09 | Stop reason: HOSPADM

## 2021-02-08 RX ORDER — SODIUM CHLORIDE, SODIUM LACTATE, POTASSIUM CHLORIDE, CALCIUM CHLORIDE 600; 310; 30; 20 MG/100ML; MG/100ML; MG/100ML; MG/100ML
INJECTION, SOLUTION INTRAVENOUS CONTINUOUS PRN
Status: DISCONTINUED | OUTPATIENT
Start: 2021-02-08 | End: 2021-02-08 | Stop reason: SDUPTHER

## 2021-02-08 RX ADMIN — SODIUM CHLORIDE, PRESERVATIVE FREE 10 ML: 5 INJECTION INTRAVENOUS at 11:10

## 2021-02-08 RX ADMIN — FENTANYL CITRATE 50 MCG: 50 INJECTION, SOLUTION INTRAMUSCULAR; INTRAVENOUS at 01:50

## 2021-02-08 RX ADMIN — PROPOFOL 200 MG: 10 INJECTION, EMULSION INTRAVENOUS at 17:07

## 2021-02-08 RX ADMIN — LIDOCAINE HYDROCHLORIDE 50 MG: 10 INJECTION, SOLUTION EPIDURAL; INFILTRATION; INTRACAUDAL; PERINEURAL at 17:07

## 2021-02-08 RX ADMIN — MIDAZOLAM 4 MG: 1 INJECTION INTRAMUSCULAR; INTRAVENOUS at 17:05

## 2021-02-08 RX ADMIN — ONDANSETRON 4 MG: 2 INJECTION INTRAMUSCULAR; INTRAVENOUS at 18:54

## 2021-02-08 RX ADMIN — SODIUM CHLORIDE, POTASSIUM CHLORIDE, SODIUM LACTATE AND CALCIUM CHLORIDE: 600; 310; 30; 20 INJECTION, SOLUTION INTRAVENOUS at 17:04

## 2021-02-08 RX ADMIN — BUPIVACAINE HYDROCHLORIDE 10 ML: 5 INJECTION, SOLUTION EPIDURAL; INTRACAUDAL at 17:00

## 2021-02-08 RX ADMIN — KETOROLAC TROMETHAMINE 15 MG: 30 INJECTION, SOLUTION INTRAMUSCULAR; INTRAVENOUS at 11:09

## 2021-02-08 RX ADMIN — PHENYLEPHRINE HYDROCHLORIDE 100 MCG: 10 INJECTION INTRAVENOUS at 17:39

## 2021-02-08 RX ADMIN — OXYCODONE HYDROCHLORIDE AND ACETAMINOPHEN 2 TABLET: 5; 325 TABLET ORAL at 22:06

## 2021-02-08 RX ADMIN — FENTANYL CITRATE 100 MCG: 50 INJECTION, SOLUTION INTRAMUSCULAR; INTRAVENOUS at 17:26

## 2021-02-08 RX ADMIN — ENOXAPARIN SODIUM 30 MG: 30 INJECTION SUBCUTANEOUS at 22:06

## 2021-02-08 RX ADMIN — HYDROMORPHONE HYDROCHLORIDE 0.5 MG: 1 INJECTION, SOLUTION INTRAMUSCULAR; INTRAVENOUS; SUBCUTANEOUS at 18:54

## 2021-02-08 RX ADMIN — FENTANYL CITRATE 100 MCG: 50 INJECTION, SOLUTION INTRAMUSCULAR; INTRAVENOUS at 17:06

## 2021-02-08 RX ADMIN — CEFAZOLIN SODIUM 2 G: 10 INJECTION, POWDER, FOR SOLUTION INTRAVENOUS at 17:14

## 2021-02-08 RX ADMIN — KETOROLAC TROMETHAMINE 15 MG: 30 INJECTION, SOLUTION INTRAMUSCULAR; INTRAVENOUS at 04:39

## 2021-02-08 RX ADMIN — BUPIVACAINE 10 ML: 13.3 INJECTION, SUSPENSION, LIPOSOMAL INFILTRATION at 17:00

## 2021-02-08 RX ADMIN — SODIUM CHLORIDE, PRESERVATIVE FREE 10 ML: 5 INJECTION INTRAVENOUS at 22:07

## 2021-02-08 ASSESSMENT — LIFESTYLE VARIABLES: SMOKING_STATUS: 0

## 2021-02-08 ASSESSMENT — PULMONARY FUNCTION TESTS
PIF_VALUE: 5
PIF_VALUE: 3
PIF_VALUE: 3
PIF_VALUE: 13
PIF_VALUE: 13
PIF_VALUE: 4
PIF_VALUE: 16
PIF_VALUE: 4
PIF_VALUE: 13
PIF_VALUE: 3
PIF_VALUE: 4
PIF_VALUE: 3
PIF_VALUE: 13
PIF_VALUE: 3
PIF_VALUE: 4
PIF_VALUE: 3
PIF_VALUE: 4
PIF_VALUE: 3
PIF_VALUE: 4
PIF_VALUE: 3
PIF_VALUE: 0
PIF_VALUE: 3
PIF_VALUE: 3
PIF_VALUE: 4
PIF_VALUE: 4
PIF_VALUE: 14
PIF_VALUE: 3
PIF_VALUE: 3
PIF_VALUE: 4
PIF_VALUE: 3
PIF_VALUE: 4

## 2021-02-08 ASSESSMENT — ENCOUNTER SYMPTOMS
SORE THROAT: 0
VOMITING: 0
EYE PAIN: 0
NAUSEA: 0
SHORTNESS OF BREATH: 0
DIARRHEA: 0
PHOTOPHOBIA: 0
SHORTNESS OF BREATH: 0
ABDOMINAL PAIN: 0
STRIDOR: 0
COUGH: 0
COLOR CHANGE: 0
RHINORRHEA: 0
FACIAL SWELLING: 0
SINUS PAIN: 0
EYE ITCHING: 0

## 2021-02-08 ASSESSMENT — PAIN SCALES - GENERAL
PAINLEVEL_OUTOF10: 9
PAINLEVEL_OUTOF10: 0
PAINLEVEL_OUTOF10: 4
PAINLEVEL_OUTOF10: 10
PAINLEVEL_OUTOF10: 0

## 2021-02-08 ASSESSMENT — PAIN DESCRIPTION - LOCATION
LOCATION: ANKLE

## 2021-02-08 ASSESSMENT — PAIN DESCRIPTION - ORIENTATION
ORIENTATION: LEFT
ORIENTATION: LEFT

## 2021-02-08 ASSESSMENT — PAIN DESCRIPTION - PAIN TYPE
TYPE: SURGICAL PAIN
TYPE: SURGICAL PAIN
TYPE: ACUTE PAIN

## 2021-02-08 ASSESSMENT — PAIN DESCRIPTION - FREQUENCY: FREQUENCY: CONTINUOUS

## 2021-02-08 ASSESSMENT — PAIN DESCRIPTION - DESCRIPTORS: DESCRIPTORS: ACHING

## 2021-02-08 NOTE — PROGRESS NOTES
Surgery     Surgery called and stated they were ready for patient. Patient notified. Patient taken down via bed.

## 2021-02-08 NOTE — PROGRESS NOTES
Surgery Clearance     Dr. Gordy Castillo Pagged, updated on status and medical history.      Dr. Gordy Castillo stated he was ok with her having surgery to let Othro know he will put notes in

## 2021-02-08 NOTE — CONSULTS
[facility]    HISTORY AND PHYSICAL EXAMINATION            Date:   2021  Patient name:  Isidoro Baeza  Date of admission:  2021 12:15 AM  MRN:   [id number]  Account:  [de-identified]  YOB: 1980  PCP:    Lila Hayes PA-C  Room:     Code Status:    Full Code    Chief Complaint:     Chief Complaint   Patient presents with    Ankle Pain     left       History Obtained From:     Patient, chart review    History of Present Illness:     Isidoro Baeza is a 41 y.o. Non-/non  female who presents with Ankle Pain (left)   and is admitted to the hospital for the management of Acute traumatic oblique fracture of the distal fibular shaft. Patient states that she slipped on the ice and fell. Admits to being intoxicated. She does not have any h/o CAD, CHF, Arrhythmia, Asthma, COPD, Chronic liver disease, chronic kidney disease, DM, blood clots. Denies any chest pain, dyspnea, palpitations, lower extremity edema, dizziness or any other symptoms. Drinks alcohol around 2-3 times/week. Does have long standing smoking history. Past Medical History:     Past Medical History:   Diagnosis Date    Anemia age 10    Bimalleolar ankle fracture, left, closed, initial encounter 2021    Chronic midline low back pain without sciatica 3/14/2019    Class 1 obesity due to excess calories with body mass index (BMI) of 32.0 to 32.9 in adult 3/14/2019    Psoriasis         Past Surgical History:     Past Surgical History:   Procedure Laterality Date     SECTION  2018    FRACTURE SURGERY  age 23,     Right little finger pins where ?  TUBAL LIGATION  19    WISDOM TOOTH EXTRACTION      Upper level        Medications Prior to Admission:     Prior to Admission medications    Medication Sig Start Date End Date Taking?  Authorizing Provider GASTROINTESTINAL:  negative for nausea, vomiting, diarrhea, constipation, change in bowel habits, abdominal pain   GENITOURINARY:  negative for difficulty of urination, burning with urination, frequency     ALLERGIC/IMMUNOLOGIC:  negative for urticaria , itching  ENDOCRINE:  negative increase in drinking, increase in urination, hot or cold intolerance  NEUROLOGICAL:  negative for headaches, dizziness, lightheadedness, numbness, pain, tingling extremities  BEHAVIOR/PSYCH:  negative for depression, anxiety    Physical Exam:   /82   Pulse 100   Temp 98.1 °F (36.7 °C) (Oral)   Resp 16   Ht 5' 9\" (1.753 m)   Wt 223 lb (101.2 kg)   SpO2 98%   BMI 32.93 kg/m²   Temp (24hrs), Av.1 °F (36.7 °C), Min:97.9 °F (36.6 °C), Max:98.2 °F (36.8 °C)    No results for input(s): POCGLU in the last 72 hours.   No intake or output data in the 24 hours ending 21 1431    General Appearance: alert, well appearing, and in no acute distress  Mental status: oriented to person, place, and time  Head: normocephalic, atraumatic  Eye: no icterus, redness, pupils equal and reactive, extraocular eye movements intact, conjunctiva clear  Ear: normal external ear, no discharge, hearing intact  Nose: no drainage noted  Mouth: mucous membranes moist  Neck: supple, no carotid bruits, thyroid not palpable  Lungs: Bilateral equal air entry, clear to ausculation, no wheezing, rales or rhonchi, normal effort  Cardiovascular: normal rate, regular rhythm,   Abdomen: Soft, nontender, nondistended, normal bowel sounds  Neurologic: There are no new focal motor or sensory deficits, normal muscle tone and bulk, no abnormal sensation, normal speech, cranial nerves II through XII grossly intact  Skin: No gross lesions, rashes, bruising or bleeding on exposed skin area  Extremities: LLE- Wrap bandage present  Psych: normal affect    Investigations:      Laboratory Testing:  Recent Results (from the past 24 hour(s))   CBC Auto Differential Collection Time: 02/08/21  1:37 AM   Result Value Ref Range    WBC 9.5 3.5 - 11.0 k/uL    RBC 4.44 4.0 - 5.2 m/uL    Hemoglobin 13.8 12.0 - 16.0 g/dL    Hematocrit 41.4 36 - 46 %    MCV 93.3 80 - 100 fL    MCH 31.1 26 - 34 pg    MCHC 33.3 31 - 37 g/dL    RDW 12.5 11.5 - 14.9 %    Platelets 207 556 - 367 k/uL    MPV 7.6 6.0 - 12.0 fL    NRBC Automated NOT REPORTED per 100 WBC    Differential Type NOT REPORTED     Seg Neutrophils 76 (H) 36 - 66 %    Lymphocytes 15 (L) 24 - 44 %    Monocytes 5 1 - 7 %    Eosinophils % 3 0 - 4 %    Basophils 1 0 - 2 %    Immature Granulocytes NOT REPORTED 0 %    Segs Absolute 7.20 1.3 - 9.1 k/uL    Absolute Lymph # 1.40 1.0 - 4.8 k/uL    Absolute Mono # 0.50 0.1 - 1.3 k/uL    Absolute Eos # 0.20 0.0 - 0.4 k/uL    Basophils Absolute 0.10 0.0 - 0.2 k/uL    Absolute Immature Granulocyte NOT REPORTED 0.00 - 0.30 k/uL    WBC Morphology NOT REPORTED     RBC Morphology NOT REPORTED     Platelet Estimate NOT REPORTED    Basic Metabolic Panel w/ Reflex to MG    Collection Time: 02/08/21  1:37 AM   Result Value Ref Range    Glucose 108 (H) 70 - 99 mg/dL    BUN 10 6 - 20 mg/dL    CREATININE 0.53 0.50 - 0.90 mg/dL    Bun/Cre Ratio NOT REPORTED 9 - 20    Calcium 9.1 8.6 - 10.4 mg/dL    Sodium 142 135 - 144 mmol/L    Potassium 4.0 3.7 - 5.3 mmol/L    Chloride 108 (H) 98 - 107 mmol/L    CO2 21 20 - 31 mmol/L    Anion Gap 13 9 - 17 mmol/L    GFR Non-African American >60 >60 mL/min    GFR African American >60 >60 mL/min    GFR Comment          GFR Staging NOT REPORTED    APTT    Collection Time: 02/08/21  1:37 AM   Result Value Ref Range    PTT 30.0 24.0 - 36.0 sec   Protime-INR    Collection Time: 02/08/21  1:37 AM   Result Value Ref Range    Protime 12.5 11.8 - 14.6 sec    INR 0.9    HCG, Quantitative, Pregnancy    Collection Time: 02/08/21  1:37 AM   Result Value Ref Range    hCG Quant <1 <5 IU/L   COVID-19    Collection Time: 02/08/21  4:15 AM    Specimen: Other   Result Value Ref Range SARS-CoV-2          SARS-CoV-2, Rapid Not Detected Not Detected    Source . NASOPHARYNGEAL SWAB     SARS-CoV-2             Imaging/Diagnostics:  Xr Tibia Fibula Left (2 Views)    Result Date: 2/8/2021  Fracture-subluxation of the ankle. Fracture of the lower leg. Xr Ankle Left (min 3 Views)    Result Date: 2/8/2021  1. Stable alignment of posterior ankle dislocation, as discussed above. 2. Mild decreased distraction of posterior malleolar vertical acute fracture. 3. Stable alignment of distal fibular diaphyseal acute oblique fracture. 4. Stable ankle mortise asymmetry consistent with ligamentous disruption. 5. Previously identified small caudal acute fracture fragment associated with medial malleolus, not well visualized in setting of casting material.     Xr Ankle Left (min 3 Views)    Result Date: 2/8/2021  Fracture-subluxation of the ankle. Fracture of the lower leg. Xr Foot Left (min 3 Views)    Result Date: 2/8/2021  Fracture-subluxation of the ankle. Fracture of the lower leg. Ct Tibia Fibula Left Wo Contrast    Result Date: 2/8/2021  Acute traumatic oblique fracture of the distal fibular shaft without extension of fracture line to the distal syndesmosis but with likely some small acute avulsion fracture fragments at the level of the distal syndesmosis in the region of the AITFL. Acute traumatic fracture of the posterior malleolus. Likely some acute small avulsion fracture fragments adjacent to the inferior aspect of the medial malleolus. There may be a trace tibiotalar joint effusion. There also appears to be a small osteochondral lesion to the anteromedial talar dome measuring 4 x 5 mm without discrete in situ fragment identified. Soft tissue swelling and subcutaneous edema about the ankle.        Assessment :      Hospital Problems           Last Modified POA    * (Principal) Bimalleolar ankle fracture, left, closed, initial encounter 2/8/2021 Yes Chronic midline low back pain without sciatica 2/8/2021 Yes    Tobacco use 2/8/2021 Yes    Alcohol use 2/8/2021 Yes          Plan:     1. . Currently does not have any symptoms or signs suggestive of unstable angina, decompensated CHF, arrhythmia or valvular heart disease. Labs reviewed. Will get EKG. Ok to proceed to surgery with low risk. 2. Gabapentin  3. Smoking cessation  4. Counseling done     Consultations:   IP CONSULT TO PODIATRY  IP CONSULT TO HOSPITALIST  IP CONSULT TO CASE MANAGEMENT     Patient is admitted as inpatient status because of co-morbidities listed above, severity of signs and symptoms as outlined, requirement for current medical therapies and most importantly because of direct risk to patient if care not provided in a hospital setting. Expected length of stay > 48 hours.     Monika Velazquez MD  2/8/2021  2:31 PM    Copy sent to Dr. Marcus White PA-C

## 2021-02-08 NOTE — PROGRESS NOTES
Surgery     Surgery planned for pickup at [facility], patient and daughter updated

## 2021-02-08 NOTE — PLAN OF CARE
Problem: Pain:  Goal: Pain level will decrease  Description: Pain level will decrease  Outcome: Ongoing, Patient is alert and oriented, able to make needs known. Patient is aware of limitations.  Patient call light within reach, Patient reports pain that is tolerable with scheduled tordol      Problem: Falls - Risk of:  Goal: Will remain free from falls  Description: Will remain free from falls  Outcome: Ongoing, NWB to left foot

## 2021-02-08 NOTE — ED NOTES
Report given to Juwan Retana from [place]. Report method by phone   The following was reviewed with receiving RN:   Current vital signs:  BP (!) 137/106   Pulse 106   Temp 98.2 °F (36.8 °C) (Oral)   Resp 18   Wt 223 lb (101.2 kg)   SpO2 97%   BMI 32.00 kg/m²                MEWS Score: 2     Any medication or safety alerts were reviewed. Any pending diagnostics and notifications were also reviewed, as well as any safety concerns or issues, abnormal labs, abnormal imaging, and abnormal assessment findings. Questions were answered.           Magdalena Gardiner RN  02/08/21 [id number]

## 2021-02-08 NOTE — PROGRESS NOTES
Admission     Patient from ED, admitted for Left ankle break due to fall while intoxicated. On arrival Patient ankle is braced with ace wrap by ED. Reports pain but declines PRN at this time. Patient reports no home medications, states she smokes Weed and tobacco.     Outside smoking in wheel chair, contracts for safety. Education provided. Daughter with patient.  Patient cell phone number if needed [phone]    Patient reports she had tubes tied and on period- no need for Depo or Prenatal Pill

## 2021-02-08 NOTE — BRIEF OP NOTE
PODIATRY BRIEF OP NOTE    PATIENT NAME: Ankur Varghese  YOB: 1980  -  41 y.o. female  MRN: [id number]  DATE: 2/8/2021  BILLING #: [id number]    Surgeon(s):  Lis Cruz DPM     ASSISTANTS: Trena Chavarria DPM, Tresas Iglesias DPM    PRE-OP DIAGNOSIS:   1. Closed displaced bimalleolar ankle fracture, left  2. Smoker    POST-OP DIAGNOSIS: Same as above. PROCEDURE:   1. Closed reduction with application of multiplanar external fixator    ANESTHESIA: General with popliteal block    HEMOSTASIS: Direct pressure    ESTIMATED BLOOD LOSS: Less than 5cc. MATERIALS: 3-0 nylon  * No implants in log *    INJECTABLES: 10 cc of 0.5% Marcaine plain. SPECIMEN:   * No specimens in log *    COMPLICATIONS: None    FINDINGS: Reduced fracture post application of external fixator, left lower extremity. See procedure note for more details. The patient was counseled at length about the risks of herminia Covid-19 during their perioperative period and any recovery window from their procedure. The patient was made aware that herminia Covid-19  may worsen their prognosis for recovering from their procedure  and lend to a higher morbidity and/or mortality risk. All material risks, benefits, and reasonable alternatives including postponing the procedure were discussed. The patient does wish to proceed with the procedure at this time.     Trena Chavarria DPM   Podiatric Medicine & Surgery   2/8/2021 at 6:25 PM

## 2021-02-08 NOTE — ED NOTES
Per podiatry residents, no new blood work is needed at 0600 on 2/8/2021 due to blood work being recently obtained.       Ravi Coreas RN  02/08/21 [id number]

## 2021-02-08 NOTE — ED PROVIDER NOTES
EMERGENCY DEPARTMENT ENCOUNTER    Pt Name: Barbara Mackey  MRN: [id number]  [place] 1980  Date of evaluation: 2/8/21  CHIEF COMPLAINT       Chief Complaint   Patient presents with    Ankle Pain     left     HISTORY OF PRESENT ILLNESS   HPI  75-year-old female history of psoriasis presents for evaluation of left ankle pain. Patient was drinking alcohol earlier tonight. She was walking in her driveway to go home immediately prior to arrival and slipped and fell. She had acute onset severe left ankle pain. Unable to bear weight. No other injuries. The recent illness. Persistent constant severe pain since onset. No home treatment prior to arrival.      REVIEW OF SYSTEMS     Review of Systems   Constitutional: Negative for chills and fatigue. HENT: Negative for facial swelling, postnasal drip and rhinorrhea. Eyes: Negative for photophobia and itching. Respiratory: Negative for cough and shortness of breath. Cardiovascular: Negative for chest pain and leg swelling. Gastrointestinal: Negative for abdominal pain, diarrhea, nausea and vomiting. Genitourinary: Negative for dysuria, flank pain and hematuria. Musculoskeletal: Positive for arthralgias. Negative for joint swelling. Skin: Negative for color change and rash. Neurological: Negative for dizziness, numbness and headaches.      PASTMEDICAL HISTORY     Past Medical History:   Diagnosis Date    Anemia age 10    Bimalleolar ankle fracture, left, closed, initial encounter 2/8/2021    Chronic midline low back pain without sciatica 3/14/2019    Class 1 obesity due to excess calories with body mass index (BMI) of 32.0 to 32.9 in adult 3/14/2019    Psoriasis      Past Problem List  Patient Active Problem List   Diagnosis Code    Smoker F17.200    Family planning Z30.09    Chronic midline low back pain without sciatica M54.5, G89.29    Class 1 obesity due to excess calories with body mass index (BMI) of 32.0 to 32.9 in adult E66.09, M57.07    Tobacco use Z72.0    Tobacco abuse counseling Z71.6    Dry heaves R11.10    Consumes alcohol at social events Z78.9    Bimalleolar ankle fracture, left, closed, initial encounter S82.815O     SURGICAL HISTORY       Past Surgical History:   Procedure Laterality Date     SECTION  2018    FRACTURE SURGERY  age 23,     Right little finger pins where ?  TUBAL LIGATION  17    WISDOM TOOTH EXTRACTION      Upper level     CURRENT MEDICATIONS       Previous Medications    GABAPENTIN (NEURONTIN) 100 MG CAPSULE    Take 1 capsule by mouth 2 times daily for 14 days. IBUPROFEN (ADVIL;MOTRIN) 600 MG TABLET    Take 1 tablet by mouth every 6 hours as needed for Pain. MEDROXYPROGESTERONE (DEPO-PROVERA) 150 MG/ML INJECTION    Inject 1 mL into the muscle once for 1 dose. NAPROXEN (NAPROSYN) 500 MG TABLET    Take 1 tablet by mouth 2 times daily (with meals). PRENATAL VIT-FE FUMARATE-FA (PRENATAL 19) 29-1 MG CHEW    Take 1 Dose by mouth daily    SODIUM CHLORIDE-SODIUM BICARB (NETI POT SINUS WASH) 2300-700 MG KIT    As per instructions 1-2 times a day     ALLERGIES     has No Known Allergies. FAMILY HISTORY     She indicated that her mother is alive. She indicated that her father is . She indicated that the status of her maternal grandmother is unknown. She indicated that her maternal grandfather is . She indicated that her paternal grandmother is . She indicated that the status of her maternal aunt is unknown. She indicated that her other is alive. She indicated that the status of her neg hx is unknown. SOCIAL HISTORY       Social History     Tobacco Use    Smoking status: Current Every Day Smoker     Packs/day: 0.50     Years: 25.00     Pack years: 12.50     Types: Cigarettes     Start date: 3/5/1994    Smokeless tobacco: Never Used    Tobacco comment: 1/2 pack daily as of 3/5/19   Substance Use Topics    Alcohol use:  Yes     Alcohol/week: 0.8 standard drinks     Types: 1 Standard drinks or equivalent per week     Frequency: 2-4 times a month     Comment: 8-12 beers on  as of 3/    Drug use: Yes     Types: Marijuana     PHYSICAL EXAM     INITIAL VITALS: BP (!) 137/106   Pulse 106   Temp 98.2 °F (36.8 °C) (Oral)   Resp 18   Wt 223 lb (101.2 kg)   SpO2 97%   BMI 32.00 kg/m²    Physical Exam  Constitutional:       Appearance: She is normal weight. HENT:      Head: Normocephalic and atraumatic. Eyes:      Extraocular Movements: Extraocular movements intact. Pupils: Pupils are equal, round, and reactive to light. Neck:      Musculoskeletal: Normal range of motion and neck supple. Cardiovascular:      Rate and Rhythm: Normal rate and regular rhythm. Pulmonary:      Effort: Pulmonary effort is normal.      Breath sounds: Normal breath sounds. Abdominal:      General: Abdomen is flat. There is no distension. Palpations: There is no mass. Musculoskeletal: Normal range of motion. General: No swelling. Comments: Swelling deformity tenderness about the left ankle. Soft compartments. Neurovascular intact. Skin:     General: Skin is warm and dry. Neurological:      General: No focal deficit present. Mental Status: She is alert. Mental status is at baseline. MEDICAL DECISION MAKIN-year-old female presents for evaluation of left ankle pain with an obvious deformity on exam.  We will get some x-rays to characterize. Anticipate need for sedation and reduction. X-rays with complex ankle fracture and posterior tibiotalar subluxation. Reduction performed at bedside and patient placed in splint. Patient tolerated well with just a dose of IV fentanyl. Some persistent subluxation on post-reduction x-ray. Podiatry consulted. Neurovascularly intact in splint. They saw the patient- no further reduction in the ED tonight, will admit anticipating surgical intervention in the morning.           CRITICAL CARE:       PROCEDURES:    Ortho Injury    Date/Time: 2/8/2021 3:55 AM  Performed by: Bryon Naqvi MD  Authorized by: Bryon Naqvi MD   Consent: Verbal consent obtained. Risks and benefits: risks, benefits and alternatives were discussed  Consent given by: patient  Patient understanding: patient states understanding of the procedure being performed  Imaging studies: imaging studies available  Patient identity confirmed: verbally with patient  Injury location: ankle  Location details: left ankle  Injury type: fracture-dislocation  Fracture type: trimalleolar  Pre-procedure neurovascular assessment: neurovascularly intact  Pre-procedure distal perfusion: normal  Pre-procedure neurological function: normal  Pre-procedure range of motion: reduced    Anesthesia:  Local anesthesia used: no    Sedation:  Patient sedated: no    Manipulation performed: yes  Skeletal traction used: yes  Reduction successful: improved but not completely reduced. Immobilization: splint  Splint type: ankle stirrup  Supplies used: Ortho-Glass  Post-procedure neurovascular assessment: post-procedure neurovascularly intact  Post-procedure distal perfusion: normal  Post-procedure neurological function: normal  Post-procedure range of motion: improved  Patient tolerance: patient tolerated the procedure well with no immediate complications  Comments: Post-reduction x-rays with improved alignment of tibiotalar joint but persistent displacement of talar dome on distal tibia     Splint Application    Date/Time: 2/8/2021 3:59 AM  Performed by: Bryon Naqvi MD  Authorized by: Bryon Naqvi MD     Consent:     Consent obtained:  Verbal    Consent given by:  Patient    Risks discussed:  Discoloration, numbness, pain and swelling    Alternatives discussed:  No treatment  Pre-procedure details:     Sensation:  Normal  Procedure details:     Laterality:  Left    Location:  Ankle    Ankle:  L ankle    Splint type:   Ankle stirrup and short leg    Supplies:  Ortho-Glass  Post-procedure details:     Pain:  Unchanged    Sensation:  Normal    Skin color:  Pink    Patient tolerance of procedure: Tolerated well, no immediate complications        DIAGNOSTIC RESULTS   EKG:All EKG's are interpreted by the Emergency Department Physician who either signs or Co-signs this chart in the absence of a cardiologist.        RADIOLOGY:All plain film, CT, MRI, and formal ultrasound images (except ED bedside ultrasound) are read by the radiologist, see reports below, unless otherwisenoted in MDM or here. XR ANKLE LEFT (MIN 3 VIEWS)   Final Result   1. Stable alignment of posterior ankle dislocation, as discussed above. 2. Mild decreased distraction of posterior malleolar vertical acute fracture. 3. Stable alignment of distal fibular diaphyseal acute oblique fracture. 4. Stable ankle mortise asymmetry consistent with ligamentous disruption. 5. Previously identified small caudal acute fracture fragment associated with   medial malleolus, not well visualized in setting of casting material.         XR TIBIA FIBULA LEFT (2 VIEWS)   Final Result   Fracture-subluxation of the ankle. Fracture of the lower leg. XR FOOT LEFT (MIN 3 VIEWS)   Final Result   Fracture-subluxation of the ankle. Fracture of the lower leg. XR ANKLE LEFT (MIN 3 VIEWS)   Final Result   Fracture-subluxation of the ankle. Fracture of the lower leg. CT TIBIA FIBULA LEFT WO CONTRAST    (Results Pending)     LABS: All lab results were reviewed by myself, and all abnormals are listed below.   Labs Reviewed   CBC WITH AUTO DIFFERENTIAL - Abnormal; Notable for the following components:       Result Value    Seg Neutrophils 76 (*)     Lymphocytes 15 (*)     All other components within normal limits   BASIC METABOLIC PANEL W/ REFLEX TO MG FOR LOW K - Abnormal; Notable for the following components:    Glucose 108 (*)     Chloride 108 (*)     All other components within normal limits   APTT   PROTIME-INR   HCG, QUANTITATIVE, PREGNANCY   COVID-19       EMERGENCY DEPARTMENTCOURSE:         Vitals:    Vitals:    02/08/21 0034   BP: (!) 137/106   Pulse: 106   Resp: 18   Temp: 98.2 °F (36.8 °C)   TempSrc: Oral   SpO2: 97%   Weight: 223 lb (101.2 kg)       The patient was given the following medications while in the emergency department:  Orders Placed This Encounter   Medications    fentaNYL (SUBLIMAZE) injection 100 mcg    nicotine (NICODERM CQ) 21 MG/24HR 1 patch    OR Linked Order Group     promethazine (PHENERGAN) tablet 12.5 mg     ondansetron (ZOFRAN) injection 4 mg    OR Linked Order Group     acetaminophen (TYLENOL) tablet 650 mg     acetaminophen (TYLENOL) suppository 650 mg    OR Linked Order Group     oxyCODONE-acetaminophen (PERCOCET) 5-325 MG per tablet 1 tablet     oxyCODONE-acetaminophen (PERCOCET) 5-325 MG per tablet 2 tablet    morphine sulfate (PF) injection 2 mg    ketorolac (TORADOL) injection 15 mg     CONSULTS:  IP CONSULT TO PODIATRY  IP CONSULT TO HOSPITALIST  IP CONSULT TO CASE MANAGEMENT    FINAL IMPRESSION      1. Closed fracture of left ankle, initial encounter          DISPOSITION/PLAN   DISPOSITION Admitted 02/08/2021 04:04:21 AM      PATIENT REFERRED TO:  No follow-up provider specified.   DISCHARGE MEDICATIONS:  New Prescriptions    No medications on file     Court Osei MD  Attending Emergency Physician                    Court Osei MD  02/08/21 [id number]

## 2021-02-08 NOTE — PROGRESS NOTES
Physical Therapy    DATE: 2021    NAME: Lito Walker  MRN: [id number]   : 1980      Patient not seen this date for Physical Therapy due to:    Surgery/Procedure: 21: Await post op orders for mobility.       Electronically signed by Rodríguez Whiting PT on 2021 at 7:55 AM

## 2021-02-08 NOTE — ED NOTES
Mode of arrival (squad #, walk in, police, etc) : walk in        Chief complaint(s): ankle pain        Arrival Note (brief scenario, treatment PTA, etc). : Pt arrives to the ED with the complaint of ankle pain. Pt states she has been drinking tequila all night and when she was getting out of the car she slipped on some ice. Pt states that she felt it roll but didn't hear a \"pop\". Pt has pulses distal to the injury and cap refill of 3 seconds. Pt foot has a visible deformity with swelling. C= \"Have you ever felt that you should Cut down on your drinking? \"  No  A= \"Have people Annoyed you by criticizing your drinking? \"  No  G= \"Have you ever felt bad or Guilty about your drinking? \"  No  E= \"Have you ever had a drink as an Eye-opener first thing in the morning to steady your nerves or to help a hangover? \"  No      Deferred []      Reason for deferring: N/A    *If yes to two or more: probable alcohol abuse. Sonya Rodgers RN  02/08/21 0010

## 2021-02-08 NOTE — ED NOTES
CT tib/fib Wo contrast ordered with clear visualization of ankle with Gustavo protocol/reformats. This writer is not trained on this protocol, and spoke to the ordering provider about completing the scan around 0600 when the CT/MR manager is here and able to assist writer with scan. Provider ok'd this request, will scan around 0600. Please hold patient in ER until scan is completed if possible, otherwise scan will be delayed until after transport arrives at 0800.

## 2021-02-08 NOTE — H&P
has a past surgical history that includes fracture surgery (age 23, 3);  section (2018); Tubal ligation (17); and [place] tooth extraction. Medications  Prior to Admission medications    Medication Sig Start Date End Date Taking? Authorizing Provider   Prenatal Vit-Fe Fumarate-FA (PRENATAL 19) 29-1 MG CHEW Take 1 Dose by mouth daily 18  Yes Alina Santoyo, DO   Sodium Chloride-Sodium Bicarb (NETI POT SINUS WASH) 2300-700 MG KIT As per instructions 1-2 times a day 5/3/16  Yes YFN Stephen - CNP   naproxen (NAPROSYN) 500 MG tablet Take 1 tablet by mouth 2 times daily (with meals). 14  Yes Angie Davis MD   ibuprofen (ADVIL;MOTRIN) 600 MG tablet Take 1 tablet by mouth every 6 hours as needed for Pain. 10/25/14  Yes Jillian Rondon MD   gabapentin (NEURONTIN) 100 MG capsule Take 1 capsule by mouth 2 times daily for 14 days. 3/29/19 4/12/19  Kary Napier PA-C   medroxyPROGESTERone (DEPO-PROVERA) 150 MG/ML injection Inject 1 mL into the muscle once for 1 dose. 13  Artemio Rod MD    Scheduled Meds:   nicotine  1 patch Transdermal Once    medroxyPROGESTERone  150 mg Intramuscular See Admin Instructions     Continuous Infusions:  PRN Meds:.promethazine **OR** ondansetron, morphine    Allergies  has No Known Allergies. Family History  family history includes Breast Cancer in an other family member; Cervical Cancer in her maternal grandmother; Colon Cancer in her maternal grandfather; Emphysema in her paternal grandmother; High Cholesterol in her mother; Imelda Macadamia in her father; Mult Sclerosis in her maternal aunt. Social History   reports that she has been smoking cigarettes. She started smoking about 26 years ago. She has a 12.50 pack-year smoking history. She has never used smokeless tobacco.   reports current alcohol use of about 0.8 standard drinks of alcohol per week. reports current drug use. Drug: Marijuana.     Objective Vitals:  Patient Vitals for the past 8 hrs:   BP Temp Temp src Pulse Resp SpO2 Weight   21 0034 (!) 137/106 98.2 °F (36.8 °C) Oral 106 18 97 % 223 lb (101.2 kg)     Average, Min, and Max for last 24 hours Vitals:  TEMPERATURE:  Temp  Av.2 °F (36.8 °C)  Min: 98.2 °F (36.8 °C)  Max: 98.2 °F (36.8 °C)    RESPIRATIONS RANGE: Resp  Av  Min: 18  Max: 18    PULSE RANGE: Pulse  Av  Min: 106  Max: 106    BLOOD PRESSURE RANGE:  Systolic (11LNT), NELLY:358 , Min:137 , RAFAL:700   ; Diastolic (56ESW), XOM:447, Min:106, Max:106      PULSE OXIMETRY RANGE: SpO2  Av %  Min: 97 %  Max: 97 %  I&O:  No intake/output data recorded. CBC:  Recent Labs     21   WBC 9.5   HGB 13.8   HCT 41.4           BMP:  Recent Labs     21      K 4.0   *   CO2 21   BUN 10   CREATININE 0.53   GLUCOSE 108*   CALCIUM 9.1        Coags:  Recent Labs     21   APTT 30.0   INR 0.9       No results found for: LABA1C  No results found for: SEDRATE  No results found for: CRP          Physical Exam:    General: A&Ox3, NAD  Heart: Regular rate and rhythm. Lungs: Equal air entry. No increased effort. Abdomen: Soft, non-tender to palpation. Not distended. Vascular: DP and PT pulses are palpable bilateral. CFT brisk to all digits. Neuro: Saph/sural/SP/DP/plantar sensation intact to light touch. Musculoskeletal: Muscle strength testing deferred due to post injury state. Gross  lateral dislocation deformity is present at the level of the left ankle. Ecchymosis is present over the medial and lateral malleolus. Aminah-malleolar edema is noted about the ankle joint. There is no significant skin tenting. Exquisite pain with palpation of the ankle joint complex. High fibular pain is present. There is pain to palpation of the syndesmosis. There is no pain to palpation of the fifth metatarsal base, medial aspect of the midfoot, or anterior process of calcaneus. Compartments are tense but compressible. Dermatologic: No fractures blisters. No open fractures or other wounds noted, bilateral.  Other dermatologic findings noted above. There is diffuse increase in warmth throughout the left lower extremity. Imaging:   XR ANKLE LEFT (MIN 3 VIEWS)   Final Result   1. Stable alignment of posterior ankle dislocation, as discussed above. 2. Mild decreased distraction of posterior malleolar vertical acute fracture. 3. Stable alignment of distal fibular diaphyseal acute oblique fracture. 4. Stable ankle mortise asymmetry consistent with ligamentous disruption. 5. Previously identified small caudal acute fracture fragment associated with   medial malleolus, not well visualized in setting of casting material.         XR TIBIA FIBULA LEFT (2 VIEWS)   Final Result   Fracture-subluxation of the ankle. Fracture of the lower leg. XR FOOT LEFT (MIN 3 VIEWS)   Final Result   Fracture-subluxation of the ankle. Fracture of the lower leg. XR ANKLE LEFT (MIN 3 VIEWS)   Final Result   Fracture-subluxation of the ankle. Fracture of the lower leg. CT TIBIA FIBULA LEFT WO CONTRAST    (Results Pending)         Assessment     Barbara Mackey is a 41 y.o. female with   1. Closed bimalleolar ankle fracture dislocation, left lower extremity   2. Smoker   3.  Psoriasis Active Problems:    Bimalleolar ankle fracture, left, closed, initial encounter  Resolved Problems:    * No resolved hospital problems. *        Plan     ? Patient examined and evaluated at bedside   ? All treatment options discussed in detail with the patient  ? Radiographs of left ankle reviewed and discussed in detail with patient--bimalleolar fracture dislocation with disruption of ankle mortise. No overt fractures or dislocations of the right foot. ? CT of the left ankle ordered for surgical planning  ? Extensive discussion had with patient regarding the nature of this injury. It was discussed in detail with the patient that surgical intervention will be necessary given the displacement of the fracture, risk of post traumatic arthritis, severe instability of this injury. We recommend surgical intervention in order to stabilize the fracture and improve position to help restore anatomic alignment. All questions were answered to the patients apparent satisfaction. She verbalized and demonstrates understanding. Patient is amenable to surgical intervention. o Closed reduction of right ankle performed by ED staff  ? Medicine consulted for medical management and surgical risk stratification. ? Patient will require surgical risk stratification prior to surgical intervention  ? Tentative plan for OR today 02/08/21, timing TBD for ORIF vs external fixation of the left ankle,  o NPO   o Covid pending  o Surgical risk stratification pending  o Hold morning AC  ?  Dressing applied to Left lower extremity: short leg posterior splint    DVT ppx: lovenox    Diet: carb control   Activity: Strict NWB to Left lower extremity  Pain Control: panel ordered, morphine was needed for breakthrough pain only    Jess Diaz DPM   Podiatric Medicine & Surgery   2/8/2021 at 4:14 AM

## 2021-02-08 NOTE — ANESTHESIA PRE PROCEDURE
Department of Anesthesiology  Preprocedure Note       Name:  Brittani Myers   Age:  41 y.o.  :  1980                                          MRN:  [id number]         Date:  2021      Surgeon: Lisette Tamez):  Lorne Ahumada, DPM    Procedure: Procedure(s):  ANKLE EXTERNAL FIXATOR APPLICATION DELTA FRAME    Medications prior to admission:   Prior to Admission medications    Medication Sig Start Date End Date Taking? Authorizing Provider   Sodium Chloride-Sodium Bicarb (NETI POT SINUS WASH) 2300-700 MG KIT As per instructions 1-2 times a day 5/3/16  Yes YFN Rosen - CNP   naproxen (NAPROSYN) 500 MG tablet Take 1 tablet by mouth 2 times daily (with meals). 14  Yes Braden Sauceda MD   ibuprofen (ADVIL;MOTRIN) 600 MG tablet Take 1 tablet by mouth every 6 hours as needed for Pain. 10/25/14  Yes Rik Richard MD   gabapentin (NEURONTIN) 100 MG capsule Take 1 capsule by mouth 2 times daily for 14 days.  3/29/19 4/12/19  Jaylen Hayden PA-C   Prenatal Vit-Fe Fumarate-FA (PRENATAL 19) 29-1 MG CHEW Take 1 Dose by mouth daily 18   Shawn Sharma DO       Current medications:    Current Facility-Administered Medications   Medication Dose Route Frequency Provider Last Rate Last Admin    nicotine (NICODERM CQ) 21 MG/24HR 1 patch  1 patch Transdermal Once Shane Masterson MD   1 patch at 21 0338    gabapentin (NEURONTIN) capsule 100 mg  100 mg Oral BID Malen Cordial, DPM        prenatal vitamin 28-0.8 MG tablet 1 tablet  1 tablet Oral Daily Malen Cordial, DPM        sodium chloride flush 0.9 % injection 10 mL  10 mL Intravenous 2 times per day Malen Cordial, DPM   10 mL at 21 1110    sodium chloride flush 0.9 % injection 10 mL  10 mL Intravenous PRN Malen Cordial, DPM        enoxaparin (LOVENOX) injection 40 mg  40 mg Subcutaneous Daily Malen Cordial, DPM  promethazine (PHENERGAN) tablet 12.5 mg  12.5 mg Oral Q6H PRN [place] Barber, DPM        Or    [facility]etron [facility] injection 4 mg  4 mg Intravenous Q6H PRN Edy Barber, DPM        polyethylene glycol (GLYCOLAX) packet 17 g  17 g Oral Daily PRN Edy Graves, DPM        acetaminophen (TYLENOL) tablet 650 mg  650 mg Oral Q6H PRN Edy Blandon, DPM        Or    acetaminophen (TYLENOL) suppository 650 mg  650 mg Rectal Q6H PRN Edy Graves, DPM        oxyCODONE-acetaminophen (PERCOCET) 5-325 MG per tablet 1 tablet  1 tablet Oral Q6H PRN [place] Graves, DPM        Or    oxyCODONE-acetaminophen (PERCOCET) 5-325 MG per tablet 2 tablet  2 tablet Oral Q6H PRN Edy Graves, DPM        morphine sulfate (PF) injection 2 mg  2 mg Intravenous Q4H PRN Edy Barber, DPM        ketorolac (TORADOL) injection 15 mg  15 mg Intravenous Q6H Edy Barber, DPM   15 mg at 21 1109       Allergies:  No Known Allergies    Problem List:    Patient Active Problem List   Diagnosis Code    Smoker F17.200    Family planning Z30.09    Chronic midline low back pain without sciatica M54.5, G89.29    Class 1 obesity due to excess calories with body mass index (BMI) of 32.0 to 32.9 in adult E66.09, Z68.32    Tobacco use Z72.0    Tobacco abuse counseling Z71.6    Dry heaves R11.10    Consumes alcohol at social events Z78.9    Bimalleolar ankle fracture, left, closed, initial encounter N90.790B       Past Medical History:        Diagnosis Date    Anemia age 13    Bimalleolar ankle fracture, left, closed, initial encounter 2021    Chronic midline low back pain without sciatica 3/14/2019    Class 1 obesity due to excess calories with body mass index (BMI) of 32.0 to 32.9 in adult 3/14/2019    Psoriasis        Past Surgical History:        Procedure Laterality Date     SECTION  2018    FRACTURE SURGERY  age 23,     Right little finger pins where ?  TUBAL LIGATION  12/18/19    WISDOM TOOTH EXTRACTION      Upper level       Social History:    Social History     Tobacco Use    Smoking status: Current Every Day Smoker     Packs/day: 0.50     Years: 25.00     Pack years: 12.50     Types: Cigarettes     Start date: 3/5/1994    Smokeless tobacco: Never Used    Tobacco comment: 1/2 pack daily as of 3/5/19   Substance Use Topics    Alcohol use: Yes     Alcohol/week: 0.8 standard drinks     Types: 1 Standard drinks or equivalent per week     Frequency: 2-4 times a month     Comment: 8-12 beers on Saurday as of 3/                                Ready to quit: Not Answered  Counseling given: Not Answered  Comment: 1/2 pack daily as of 3/5/19      Vital Signs (Current):   Vitals:    02/08/21 0034 02/08/21 0741 02/08/21 0810   BP: (!) 137/106 107/68    Pulse: 106 109    Resp: 18 16    Temp: 98.2 °F (36.8 °C) 97.9 °F (36.6 °C)    TempSrc: Oral Oral    SpO2: 97% 99%    Weight: 223 lb (101.2 kg)  223 lb (101.2 kg)   Height:   5' 9\" (1.753 m)                                              BP Readings from Last 3 Encounters:   02/08/21 107/68   03/29/19 (!) 135/90   03/05/19 (!) 142/95       NPO Status:                                                                                 BMI:   Wt Readings from Last 3 Encounters:   02/08/21 223 lb (101.2 kg)   03/29/19 230 lb (104.3 kg)   03/05/19 228 lb 12.8 oz (103.8 kg)     Body mass index is 32.93 kg/m².     CBC:   Lab Results   Component Value Date    WBC 9.5 02/08/2021    RBC 4.44 02/08/2021    HGB 13.8 02/08/2021    HCT 41.4 02/08/2021    MCV 93.3 02/08/2021    RDW 12.5 02/08/2021     02/08/2021       CMP:   Lab Results   Component Value Date     02/08/2021    K 4.0 02/08/2021     02/08/2021    CO2 21 02/08/2021    BUN 10 02/08/2021    CREATININE 0.53 02/08/2021    GFRAA >60 02/08/2021    LABGLOM >60 02/08/2021    GLUCOSE 108 02/08/2021    CALCIUM 9.1 02/08/2021 POC Tests: No results for input(s): POCGLU, POCNA, POCK, POCCL, POCBUN, POCHEMO, POCHCT in the last 72 hours. Coags:   Lab Results   Component Value Date    PROTIME 12.5 02/08/2021    INR 0.9 02/08/2021    APTT 30.0 02/08/2021       HCG (If Applicable):   Lab Results   Component Value Date    PREGTESTUR POSITIVE (A) 04/24/2018    HCGQUANT <1 02/08/2021        ABGs: No results found for: PHART, PO2ART, ACI6OFK, EVD2QRQ, BEART, H3QOPCFQ     Type & Screen (If Applicable):  No results found for: LABABO, LABRH    Drug/Infectious Status (If Applicable):  No results found for: HIV, HEPCAB    COVID-19 Screening (If Applicable):   Lab Results   Component Value Date    COVID19 Not Detected 02/08/2021         Anesthesia Evaluation  Patient summary reviewed and Nursing notes reviewed no history of anesthetic complications:   Airway: Mallampati: II  TM distance: >3 FB   Neck ROM: full  Mouth opening: > = 3 FB Dental:          Pulmonary:Negative Pulmonary ROS and normal exam  breath sounds clear to auscultation      (-) pneumonia, COPD, asthma, shortness of breath, recent URI, sleep apnea, rhonchi, wheezes, rales, stridor and not a current smoker          Patient smoked on day of surgery.                  Cardiovascular:Negative CV ROS  Exercise tolerance: good (>4 METS),       (-) pacemaker, hypertension, valvular problems/murmurs, past MI, CAD, CABG/stent, dysrhythmias,  angina,  CHF, orthopnea, PND,  WILSON, murmur, weak pulses,  friction rub, systolic click, carotid bruit,  JVD and peripheral edema      Rhythm: regular  Rate: normal           Beta Blocker:  Not on Beta Blocker         Neuro/Psych:   (+) neuromuscular disease:,    (-) seizures, CVA, headaches, psychiatric history and depression/anxiety            GI/Hepatic/Renal: Neg GI/Hepatic/Renal ROS       (-) hiatal hernia, GERD, PUD, hepatitis, liver disease, no renal disease, bowel prep and no morbid obesity       Endo/Other: Negative Endo/Other ROS

## 2021-02-08 NOTE — CARE COORDINATION
CASE MANAGEMENT NOTE:    Admission Date:  2/8/2021 Ambreen Osman is a 41 y.o.  female    Admitted for : Bimalleolar ankle fracture, left, closed, initial encounter [E81.395Z]    Met with:  Patient and daughter    PCP:  ANTONIO Carcamo                                Insurance:  [facility]      Current Residence/ Living Arrangements:  independently at home with spouse and family. Lives in a 2 story home (bathroom upstairs)           Current Services PTA:  No    Is patient agreeable to VNS: No    Freedom of choice provided:  Yes    List of [address] provided: No    VNS chosen:  NA    DME:  None, will follow for DME post op    Home Oxygen: No    Nebulizer: No    CPAP/BIPAP: No    Supplier: N/A    Potential Assistance Needed: DME- walker or crutches? Will most likely need [facility]    SNF needed: No    Freedom of choice and list provided: NA    Pharmacy:  [address] [place] [address] on [facility]       Does Patient want to use MEDS to BEDS? No    Is patient currently receiving oral anticoagulation therapy? No    Is the Patient an YUM[facility]KO RIBEIRO [facility] with Readmission Risk Score greater than 14%? No  If yes, pt needs a follow up appointment made within 7 days. Family Members/Caregivers that pt would like involved in their care:    Yes    If yes, list name here:  [address] [place] [address]    Transportation Provider:  Patient and Family                    Discharge Plan:  Home, DME needs to be determined.                  Electronically signed by: Renetta Black RN on 2/8/2021 at 9:33 AM

## 2021-02-08 NOTE — PROGRESS NOTES
A consult was placed for Hospilist and as patient does no have a Doctor that comes here to [facility] Dr Ferdinand Barba was consulted and notified at [phone]. .. Thank you!

## 2021-02-08 NOTE — PROGRESS NOTES
[facility]    OCCUPATIONAL THERAPY MISSED TREATMENT NOTE   INPATIENT   Date: 21  Patient Name: Mellody Dandy       Room: 2662/8072-62  MRN: [id number]   Account #: [de-identified]    : 1980  (41 y.o.)  Gender: female                 REASON FOR MISSED TREATMENT:  Patient unable to participate   -   Surgery  - Pt scheduled for surgery this date. Await post-op orders for mobility.    Nahomi Che, OT

## 2021-02-08 NOTE — PROGRESS NOTES
Dr. Syd Arzate with patient regarding surgery and education provided.      N.O. patient can drink for 1 hr and eat and then NPO

## 2021-02-09 VITALS
TEMPERATURE: 98 F | BODY MASS INDEX: 33.03 KG/M2 | OXYGEN SATURATION: 96 % | SYSTOLIC BLOOD PRESSURE: 92 MMHG | HEART RATE: 90 BPM | HEIGHT: 69 IN | WEIGHT: 223 LBS | RESPIRATION RATE: 22 BRPM | DIASTOLIC BLOOD PRESSURE: 64 MMHG

## 2021-02-09 LAB
ABSOLUTE EOS #: 0.4 K/UL (ref 0–0.4)
ABSOLUTE IMMATURE GRANULOCYTE: ABNORMAL K/UL (ref 0–0.3)
ABSOLUTE LYMPH #: 1.6 K/UL (ref 1–4.8)
ABSOLUTE MONO #: 0.5 K/UL (ref 0.1–1.3)
ANION GAP SERPL CALCULATED.3IONS-SCNC: 9 MMOL/L (ref 9–17)
BASOPHILS # BLD: 1 % (ref 0–2)
BASOPHILS ABSOLUTE: 0.1 K/UL (ref 0–0.2)
BUN BLDV-MCNC: 10 MG/DL (ref 6–20)
BUN/CREAT BLD: ABNORMAL (ref 9–20)
CALCIUM SERPL-MCNC: 8.6 MG/DL (ref 8.6–10.4)
CHLORIDE BLD-SCNC: 103 MMOL/L (ref 98–107)
CO2: 23 MMOL/L (ref 20–31)
CREAT SERPL-MCNC: 0.48 MG/DL (ref 0.5–0.9)
DIFFERENTIAL TYPE: ABNORMAL
EOSINOPHILS RELATIVE PERCENT: 5 % (ref 0–4)
GFR AFRICAN AMERICAN: >60 ML/MIN
GFR NON-AFRICAN AMERICAN: >60 ML/MIN
GFR SERPL CREATININE-BSD FRML MDRD: ABNORMAL ML/MIN/{1.73_M2}
GFR SERPL CREATININE-BSD FRML MDRD: ABNORMAL ML/MIN/{1.73_M2}
GLUCOSE BLD-MCNC: 99 MG/DL (ref 70–99)
HCT VFR BLD CALC: 33.7 % (ref 36–46)
HEMOGLOBIN: 11.3 G/DL (ref 12–16)
IMMATURE GRANULOCYTES: ABNORMAL %
LYMPHOCYTES # BLD: 22 % (ref 24–44)
MCH RBC QN AUTO: 31.2 PG (ref 26–34)
MCHC RBC AUTO-ENTMCNC: 33.6 G/DL (ref 31–37)
MCV RBC AUTO: 92.9 FL (ref 80–100)
MONOCYTES # BLD: 7 % (ref 1–7)
NRBC AUTOMATED: ABNORMAL PER 100 WBC
PDW BLD-RTO: 12.7 % (ref 11.5–14.9)
PLATELET # BLD: 265 K/UL (ref 150–450)
PLATELET ESTIMATE: ABNORMAL
PMV BLD AUTO: 7 FL (ref 6–12)
POTASSIUM SERPL-SCNC: 4 MMOL/L (ref 3.7–5.3)
RBC # BLD: 3.62 M/UL (ref 4–5.2)
RBC # BLD: ABNORMAL 10*6/UL
SEG NEUTROPHILS: 65 % (ref 36–66)
SEGMENTED NEUTROPHILS ABSOLUTE COUNT: 4.7 K/UL (ref 1.3–9.1)
SODIUM BLD-SCNC: 135 MMOL/L (ref 135–144)
VITAMIN D 25-HYDROXY: 16.2 NG/ML (ref 30–100)
WBC # BLD: 7.3 K/UL (ref 3.5–11)
WBC # BLD: ABNORMAL 10*3/UL

## 2021-02-09 PROCEDURE — 20692 APPL MLTPLN UNI EXT FIXJ SYS: CPT | Performed by: PODIATRIST

## 2021-02-09 PROCEDURE — 82306 VITAMIN D 25 HYDROXY: CPT

## 2021-02-09 PROCEDURE — 6370000000 HC RX 637 (ALT 250 FOR IP): Performed by: STUDENT IN AN ORGANIZED HEALTH CARE EDUCATION/TRAINING PROGRAM

## 2021-02-09 PROCEDURE — 97161 PT EVAL LOW COMPLEX 20 MIN: CPT

## 2021-02-09 PROCEDURE — 6360000002 HC RX W HCPCS: Performed by: STUDENT IN AN ORGANIZED HEALTH CARE EDUCATION/TRAINING PROGRAM

## 2021-02-09 PROCEDURE — 97530 THERAPEUTIC ACTIVITIES: CPT

## 2021-02-09 PROCEDURE — 80048 BASIC METABOLIC PNL TOTAL CA: CPT

## 2021-02-09 PROCEDURE — 2580000003 HC RX 258: Performed by: STUDENT IN AN ORGANIZED HEALTH CARE EDUCATION/TRAINING PROGRAM

## 2021-02-09 PROCEDURE — 36415 COLL VENOUS BLD VENIPUNCTURE: CPT

## 2021-02-09 PROCEDURE — 85025 COMPLETE CBC W/AUTO DIFF WBC: CPT

## 2021-02-09 PROCEDURE — 97116 GAIT TRAINING THERAPY: CPT

## 2021-02-09 PROCEDURE — 97165 OT EVAL LOW COMPLEX 30 MIN: CPT

## 2021-02-09 RX ORDER — OXYCODONE AND ACETAMINOPHEN 10; 325 MG/1; MG/1
1 TABLET ORAL EVERY 6 HOURS PRN
Qty: 28 TABLET | Refills: 0 | Status: SHIPPED | OUTPATIENT
Start: 2021-02-09 | End: 2021-02-16 | Stop reason: SDUPTHER

## 2021-02-09 RX ORDER — CYCLOBENZAPRINE HCL 5 MG
10 TABLET ORAL 2 TIMES DAILY PRN
Qty: 10 TABLET | Refills: 0 | Status: SHIPPED | OUTPATIENT
Start: 2021-02-09 | End: 2021-02-19

## 2021-02-09 RX ADMIN — KETOROLAC TROMETHAMINE 15 MG: 30 INJECTION, SOLUTION INTRAMUSCULAR; INTRAVENOUS at 11:01

## 2021-02-09 RX ADMIN — OXYCODONE HYDROCHLORIDE AND ACETAMINOPHEN 2 TABLET: 5; 325 TABLET ORAL at 04:07

## 2021-02-09 RX ADMIN — ENOXAPARIN SODIUM 30 MG: 30 INJECTION SUBCUTANEOUS at 07:35

## 2021-02-09 RX ADMIN — KETOROLAC TROMETHAMINE 15 MG: 30 INJECTION, SOLUTION INTRAMUSCULAR; INTRAVENOUS at 17:36

## 2021-02-09 RX ADMIN — SODIUM CHLORIDE, PRESERVATIVE FREE 10 ML: 5 INJECTION INTRAVENOUS at 07:35

## 2021-02-09 RX ADMIN — OXYCODONE HYDROCHLORIDE AND ACETAMINOPHEN 2 TABLET: 5; 325 TABLET ORAL at 10:14

## 2021-02-09 RX ADMIN — OXYCODONE HYDROCHLORIDE AND ACETAMINOPHEN 2 TABLET: 5; 325 TABLET ORAL at 16:48

## 2021-02-09 ASSESSMENT — PAIN DESCRIPTION - ONSET: ONSET: ON-GOING

## 2021-02-09 ASSESSMENT — PAIN DESCRIPTION - DESCRIPTORS
DESCRIPTORS: ACHING;DULL
DESCRIPTORS: PRESSURE;RADIATING
DESCRIPTORS: PRESSURE

## 2021-02-09 ASSESSMENT — PAIN DESCRIPTION - PAIN TYPE
TYPE: SURGICAL PAIN

## 2021-02-09 ASSESSMENT — PAIN DESCRIPTION - LOCATION
LOCATION: ANKLE

## 2021-02-09 ASSESSMENT — PAIN DESCRIPTION - ORIENTATION: ORIENTATION: LEFT

## 2021-02-09 ASSESSMENT — PAIN SCALES - GENERAL
PAINLEVEL_OUTOF10: 7
PAINLEVEL_OUTOF10: 8

## 2021-02-09 ASSESSMENT — PAIN DESCRIPTION - DIRECTION: RADIATING_TOWARDS: L HIP

## 2021-02-09 NOTE — PROGRESS NOTES
[facility]    Progress Note    2/9/2021    2:12 PM    Name:   Niharika Sotelo  MRN:     [id number]     Acct:      [de-identified]   Room:   2050/2050-01   Day:  1  Admit Date:  2/8/2021 12:15 AM    PCP:   Emi Mullen PA-C  Code Status:  Full Code    Subjective:     C/C:   Chief Complaint   Patient presents with    Ankle Pain     left     Interval History Status: improved. Patient underwent closed reduction of  with application of multiplanar external fixator of the left bimalleolar ankle fracture yesterday  She tolerated the procedure well  Pain is well controlled  Patient sleeping comfortably  Afebrile  BP low normal  No leucocytosis  Hemoglobin stable  Creatinine normal      Review of Systems:     Constitutional:  negative for chills, fevers, sweats  Respiratory:  negative for cough, dyspnea on exertion, shortness of breath, wheezing  Cardiovascular:  negative for chest pain, chest pressure/discomfort, lower extremity edema, palpitations  Gastrointestinal:  negative for abdominal pain, constipation, diarrhea, nausea, vomiting  Neurological:  negative for dizziness, headache    Medications:      Allergies:  No Known Allergies    Current Meds:   Scheduled Meds:    gabapentin  100 mg Oral BID    prenatal vitamin  1 tablet Oral Daily    sodium chloride flush  10 mL Intravenous 2 times per day    ketorolac  15 mg Intravenous Q6H    enoxaparin  30 mg Subcutaneous BID     Continuous Infusions:   PRN Meds: sodium chloride flush, promethazine **OR** ondansetron, polyethylene glycol, acetaminophen **OR** acetaminophen, oxyCODONE-acetaminophen **OR** oxyCODONE-acetaminophen, morphine    Data: Past Medical History:   has a past medical history of Anemia, Bimalleolar ankle fracture, left, closed, initial encounter, Chronic midline low back pain without sciatica, Class 1 obesity due to excess calories with body mass index (BMI) of 32.0 to 32.9 in adult, and Psoriasis. Social History:   reports that she has been smoking cigarettes. She started smoking about 26 years ago. She has a 12.50 pack-year smoking history. She has never used smokeless tobacco. She reports current alcohol use of about 0.8 standard drinks of alcohol per week. She reports current drug use. Drug: Marijuana. Family History:   Family History   Problem Relation Age of Onset    Colon Cancer Maternal Grandfather         60-70s    Breast Cancer Other         maternal great aunt age of onset uncertain    High Cholesterol Mother     Lung Cancer Father     Cervical Cancer Maternal Grandmother     Emphysema Paternal Grandmother     Mult Sclerosis Maternal Aunt     Cancer Neg Hx     Diabetes Neg Hx     Eclampsia Neg Hx     Hypertension Neg Hx     Ovarian Cancer Neg Hx      Labor Neg Hx     Spont Abortions Neg Hx     Stroke Neg Hx        Vitals:  BP 92/64   Pulse 90   Temp 98 °F (36.7 °C) (Oral)   Resp 22   Ht 5' 9\" (1.753 m)   Wt 223 lb (101.2 kg)   SpO2 96%   BMI 32.93 kg/m²   Temp (24hrs), Av.1 °F (36.7 °C), Min:94.6 °F (34.8 °C), Max:98.4 °F (36.9 °C)    No results for input(s): POCGLU in the last 72 hours. I/O (24Hr):     Intake/Output Summary (Last 24 hours) at 2021 1412  Last data filed at 2021 0616  Gross per 24 hour   Intake 1530 ml   Output 605 ml   Net 925 ml       Labs:  Hematology:  Recent Labs     21  0636   WBC 9.5 7.3   RBC 4.44 3.62*   HGB 13.8 11.3*   HCT 41.4 33.7*   MCV 93.3 92.9   MCH 31.1 31.2   MCHC 33.3 33.6   RDW 12.5 12.7    265   MPV 7.6 7.0   INR 0.9  --      Chemistry:  Recent Labs     21  01321  0636    135   K 4.0 4.0 * 103   CO2 21 23   GLUCOSE 108* 99   BUN 10 10   CREATININE 0.53 0.48*   ANIONGAP 13 9   LABGLOM >60 >60   GFRAA >60 >60   CALCIUM 9.1 8.6   No results for input(s): PROT, LABALBU, LABA1C, O8QSBMS, B1ZBVBN, FT4, TSH, AST, ALT, LDH, GGT, ALKPHOS, LABGGT, BILITOT, BILIDIR, AMMONIA, AMYLASE, LIPASE, LACTATE, CHOL, HDL, LDLCHOLESTEROL, CHOLHDLRATIO, TRIG, VLDL, VCJ89QZ, PHENYTOIN, PHENYF, URICACID, POCGLU in the last 72 hours. ABG:No results found for: POCPH, PHART, PH, POCPCO2, MSA5LZK, PCO2, POCPO2, PO2ART, PO2, POCHCO3, YDE4FVV, HCO3, NBEA, PBEA, BEART, BE, THGBART, THB, ZUH6FEX, WNDD1EGX, V4UJWOZK, O2SAT, FIO2  Lab Results   Component Value Date/Time    SPECIAL NOT REPORTED 11/11/2011 07:21 PM     No results found for: CULTURE    Radiology:  Xr Tibia Fibula Left (2 Views)    Result Date: 2/8/2021  Fracture-subluxation of the ankle. Fracture of the lower leg. Xr Ankle Left (min 3 Views)    Result Date: 2/8/2021  1. Stable alignment of posterior ankle dislocation, as discussed above. 2. Mild decreased distraction of posterior malleolar vertical acute fracture. 3. Stable alignment of distal fibular diaphyseal acute oblique fracture. 4. Stable ankle mortise asymmetry consistent with ligamentous disruption. 5. Previously identified small caudal acute fracture fragment associated with medial malleolus, not well visualized in setting of casting material.     Xr Ankle Left (min 3 Views)    Result Date: 2/8/2021  Fracture-subluxation of the ankle. Fracture of the lower leg. Xr Foot Left (min 3 Views)    Result Date: 2/8/2021  Fracture-subluxation of the ankle. Fracture of the lower leg.      Ct Tibia Fibula Left Wo Contrast    Result Date: 2/8/2021 Acute traumatic oblique fracture of the distal fibular shaft without extension of fracture line to the distal syndesmosis but with likely some small acute avulsion fracture fragments at the level of the distal syndesmosis in the region of the AITFL. Acute traumatic fracture of the posterior malleolus. Likely some acute small avulsion fracture fragments adjacent to the inferior aspect of the medial malleolus. There may be a trace tibiotalar joint effusion. There also appears to be a small osteochondral lesion to the anteromedial talar dome measuring 4 x 5 mm without discrete in situ fragment identified. Soft tissue swelling and subcutaneous edema about the ankle.        Physical Examination:        General appearance:  alert, cooperative and no distress  Mental Status:  oriented to person, place and time and normal affect  Lungs:  clear to auscultation bilaterally, normal effort  Heart:  regular rate and rhythm, no murmur  Abdomen:  soft, nontender, nondistended, normal bowel sounds,   Extremities:  LLE-Bandage present, external fixator present    Assessment:        Hospital Problems           Last Modified POA    * (Principal) Bimalleolar ankle fracture, left, closed, initial encounter 2/8/2021 Yes    Chronic midline low back pain without sciatica 2/8/2021 Yes    Tobacco use 2/8/2021 Yes    Alcohol use 2/8/2021 Yes          Plan:        S/p closed reduction of  with application of multiplanar external fixator  Pain control  Smoking cessation  DC per primary    Aaron Davis MD  2/9/2021  2:12 PM

## 2021-02-09 NOTE — PROGRESS NOTES
Physical Therapy  Facility/Department: [facility] MED SURG  Daily Treatment Note  NAME: Ziyad Gracia  : 1980  MRN: [id number]    Date of Service: 2021    Discharge Recommendations:  Patient would benefit from continued therapy after discharge, Continue to assess pending progress   PT Equipment Recommendations  Equipment Needed: Yes  Walker: Rolling  Wheelchair: Light Weight(with elevated leg rest on L)  Other: Pt would benefit from use of RW and w/c upon d/c. Pt will need a w/c for longer distances due to fatigue and WB restrictions with elevated leg rest on L. Assessment   Body structures, Functions, Activity limitations: Decreased functional mobility ; Decreased ADL status; Decreased ROM; Decreased strength;Decreased safe awareness;Decreased endurance;Decreased balance; Increased pain  Assessment: Pt requiring 1 assist for all mobility. Pt educated on use of RW and positioning. Pt emphasized on safety and having assist. Pt would benefit from continued PT upon d/c. Treatment Diagnosis: Impaired functional mobility 2* L ankle fx  Specific instructions for Next Treatment: stair training, gait, w/c mobility, SAFETY  Prognosis: Good  Decision Making: Low Complexity  History: s/p ANKLE EXTERNAL FIXATOR APPLICATION on 3/9/37  Exam: ROM, MMT, bed mobility, transfers, amb, balance, activity tolerance  Clinical Presentation: Pt alert, cooperative, agreeable to PT. Barriers to Learning: pain  REQUIRES PT FOLLOW UP: Yes  Activity Tolerance  Activity Tolerance: Patient limited by pain; Patient Tolerated treatment well     Patient Diagnosis(es): The primary encounter diagnosis was Closed fracture of left ankle, initial encounter. A diagnosis of Post-op pain was also pertinent to this visit. has a past medical history of Anemia, Bimalleolar ankle fracture, left, closed, initial encounter, Chronic midline low back pain without sciatica, Class 1 obesity due to excess calories with body mass index (BMI) of 32.0 to 32.9 in adult, and Psoriasis. has a past surgical history that includes fracture surgery (age 23, 3);  section (2018); Tubal ligation (17); [place] tooth extraction; and Ankle surgery (Left, 2021). Restrictions  Restrictions/Precautions  Restrictions/Precautions: General Precautions, Weight Bearing, Fall Risk  Required Braces or Orthoses?: No  Implants present? : Metal implants(L ankle external fixator)  Lower Extremity Weight Bearing Restrictions  Left Lower Extremity Weight Bearing: Non Weight Bearing  Position Activity Restriction  Other position/activity restrictions: up with assistance  Subjective   General  Chart Reviewed: Yes  Additional Pertinent Hx: external fixation of L ankle on 21  Family / Caregiver Present: No  Referring Practitioner: Missy Delgado DPM  Subjective  Subjective: Pt is agreeable for stair training. Edu on importance of w/c and RW for ease with functional mobility. Pt declines the need for w/c at this time. Pain Screening  Patient Currently in Pain: Yes  Pain Assessment  Pain Assessment: 0-10  Pain Level: 5  Pain Type: Surgical pain  Pain Location: Ankle  Pain Orientation: Left  Clinical Progression: Gradually worsening  Non-Pharmaceutical Pain Intervention(s): Cold applied; Ambulation/Increased Activity;Repositioned  Response to Pain Intervention: Patient Satisfied  Vital Signs  BP Location: Right upper arm  Level of Consciousness: Alert (0)  Patient Currently in Pain: Yes  Oxygen Therapy  O2 Device: None (Room air)  Patient Observation  Observations: LLE external fixator with ACE wrap       Orientation  Orientation  Overall Orientation Status: Within Normal Limits  Cognition      Objective   Bed mobility Supine to Sit: Modified independent  Sit to Supine: Modified independent  Scooting: Stand by assistance  Comment: No difficulties noted. Writer assisted donning pt's pants due to external fixator. Transfers  Sit to Stand: Contact guard assistance;Stand by assistance  Stand to sit: Contact guard assistance;Stand by assistance  Comment: SBA/CGA with device using RW. Demo and cues given for technique. Pt performs STS from bed, toilet, and stairs. Pt would benefit from additonal sit to stand practice from bottom/tops of stairs. Ambulation  Ambulation?: Yes  WB Status: NWB LLE  Ambulation 1  Surface: level tile  Device: Rolling Walker  Assistance: Contact guard assistance  Quality of Gait: Pt is grossly steady, no LOB noted. Decrease lateral sway noted. Edu on how to adjust RW to appropriate height, good understanding. Distance: 50'x2 and 15'x1  Comments: Pt is fast moving, decreased safety awareness. Cues on safety and to have assist nearby for all mobility. Stairs/Curb  Stairs?: Yes  Stairs  # Steps : 12  Stairs Height: 8\"  Rails: Right ascending  Device: Rolling walker  Other Apparatus: (external fixator)  Assistance: Contact guard assistance;Minimal assistance  Comment: Pt edu on sequencing with bumping up on stairs technique. Min A to complete safe stand to sit on bottom step while maintaing NWB with LLE. Pt able to bump up on stairs with CGA/SBA however reports min pain/discomfort in L hip area. Edu on safe technique to place chair at top of step, then to bump up to sit on chair prior to standing. Pt verbailizes good understanding at this time. Pt declines to attempt PT training stairs with retro technique using Miguel hand rails. Pt states she will bump up on porch steps to enter home. Balance  Posture: Good  Sitting - Static: Good  Sitting - Dynamic: Good  Standing - Static: Fair;+  Standing - Dynamic: Fair  Comments: Fall risk, standing balance with RW.    Other exercises  Other exercises?: Yes Other exercises 1: Education on positioning, use of ice for LLE(Cold pack applied this PM. )  Other exercises 2: Edu/review on HEP, good understanding. Pt has no further questions at this time. AROM LLE (degrees)  LLE AROM : WFL  LLE General AROM: at hip/knee, no ankle     Comment: rest breaks PRN. Goals  Short term goals  Time Frame for Short term goals: 3-4 days  Short term goal 1: pt to demo IND bed mobility. Short term goal 2: Pt to perform SBA transfers. Short term goal 3: Pt to amb 50'-75' with RW SBA. Short term goal 4: Pt to ascend/descend 3 steps per home entry, CGA. Short term goal 5: Pt to scoot on buttocks 1 flight of steps CGA for assist with LLE prn. Short term goal 6: Pt to improve standing tolerance/balance to GOOD-/FAIR+  Short term goal 7: Pt to propel w/c 75' Mod I on varied surfaces. Patient Goals   Patient goals : To go home/reduce pain    Plan    Plan  Times per week: 6-7x/week  Specific instructions for Next Treatment: stair training, gait, w/c mobility, SAFETY  Current Treatment Recommendations: Strengthening, Functional Mobility Training, Balance Training, ROM, Transfer Training, Equipment Evaluation, Education, & procurement, Patient/Caregiver Education & Training, Safety Education & Training, Pain Management, Home Exercise Program, Stair training, Gait Training, Endurance Training, Positioning, Wheelchair Mobility Training  Safety Devices  Type of devices:  All fall risk precautions in place, Call light within reach, Gait belt, Patient at risk for falls, Left in bed, Nurse notified     Therapy Time   Individual Concurrent Group Co-treatment   Time In 1254         Time Out 1330         Minutes [address] [place], [place]

## 2021-02-09 NOTE — PLAN OF CARE
Problem: Pain:  Description: Pain management should include both nonpharmacologic and pharmacologic interventions. Goal: Pain level will decrease  Description: Pain level will decrease  2/9/2021 0330 by King Toledo RN  Outcome: Ongoing  Note: Patients pain level decreased with PRN medications. 2/8/2021 1426 by Kimberlyn Chen RN  Outcome: Ongoing     Problem: Falls - Risk of:  Goal: Will remain free from falls  Description: Will remain free from falls  2/9/2021 0330 by King Toledo RN  Outcome: Ongoing  Note: No falls during this shift. Call light is within reach. Side rails up x2 with bed in lowest position. Patient safety maintained. 2/8/2021 1426 by Kimberlyn Chen RN  Outcome: Ongoing     Problem: Falls - Risk of:  Goal: Absence of physical injury  Description: Absence of physical injury  2/9/2021 0330 by King Toledo RN  Outcome: Ongoing  Note: No injury this shift. Patient safety maintained.    2/8/2021 1426 by Kimberlyn Chen RN  Outcome: Ongoing

## 2021-02-09 NOTE — PLAN OF CARE
Problem: Pain:  Goal: Pain level will decrease  Description: Pain level will decrease  2/9/2021 1624 by Sulaiman Vasquez RN  Outcome: Completed  2/9/2021 0330 by King Lock RN  Outcome: Ongoing  Note: Patients pain level decreased with PRN medications. Goal: Control of acute pain  Description: Control of acute pain  2/9/2021 1624 by Sulaiman Vasquez RN  Outcome: Completed  2/9/2021 0330 by King Lock RN  Outcome: Ongoing  Goal: Control of chronic pain  Description: Control of chronic pain  2/9/2021 1624 by Sulaiman Vasquez RN  Outcome: Completed  2/9/2021 0330 by King Lock RN  Outcome: Ongoing     Problem: Falls - Risk of:  Goal: Will remain free from falls  Description: Will remain free from falls  2/9/2021 1624 by Sulaiman Vasquez RN  Outcome: Completed  2/9/2021 0330 by King Lock RN  Outcome: Ongoing  Note: No falls during this shift. Call light is within reach. Side rails up x2 with bed in lowest position. Patient safety maintained. Goal: Absence of physical injury  Description: Absence of physical injury  2/9/2021 1624 by Sulaiman Vasquez RN  Outcome: Completed  2/9/2021 0330 by Knig Lock RN  Outcome: Ongoing  Note: No injury this shift. Patient safety maintained.       Problem: Skin Integrity:  Goal: Will show no infection signs and symptoms  Description: Will show no infection signs and symptoms  2/9/2021 1624 by Sulaiman Vasquez RN  Outcome: Completed  2/9/2021 0330 by King Lock RN  Outcome: Ongoing  Goal: Absence of new skin breakdown  Description: Absence of new skin breakdown  2/9/2021 1624 by Sulaiman Vasquez RN  Outcome: Completed  2/9/2021 0330 by King Lock RN  Outcome: Ongoing     Problem: Musculor/Skeletal Functional Status  Goal: Highest potential functional level  Outcome: Completed  Goal: Absence of falls  Outcome: Completed

## 2021-02-09 NOTE — OP NOTE
PODIATRY OP NOTE     PATIENT NAME: Abhinav Mckeon  YOB: 1980  -  41 y.o. female  MRN: [id number]  DATE: 2/8/2021  BILLING #: [id number]     Surgeon(s):  Zacarias Gil DPM      ASSISTANTS: Alissa Peoples DPM, Ricardo Park DPM     PRE-OP DIAGNOSIS:   1. Closed displaced bimalleolar ankle fracture, left  2. Smoker     POST-OP DIAGNOSIS: Same as above.     PROCEDURE:   1. Closed reduction with application of multiplanar external fixator     ANESTHESIA: General with popliteal block     HEMOSTASIS: Direct pressure     ESTIMATED BLOOD LOSS: Less than 5cc.     MATERIALS: 3-0 nylon  * No implants in log *     INJECTABLES: 10 cc of 0.5% Marcaine plain.     SPECIMEN:   * No specimens in log *     COMPLICATIONS: None     FINDINGS: Reduced fracture post application of external fixator, left lower extremity. See procedure note for more details.     The patient was counseled at length about the risks of herminia Covid-19 during their perioperative period and any recovery window from their procedure.  The patient was made aware that herminia Covid-19  may worsen their prognosis for recovering from their procedure  and lend to a higher morbidity and/or mortality risk.  All material risks, benefits, and reasonable alternatives including postponing the procedure were discussed. The patient does wish to proceed with the procedure at this time. INDICATIONS FOR PROCEDURE:  This patient sustained a displaced bimalleolar ankle fracture after a fall from a standing height. The patient was placed in a posterior splint and admitted to the hospital for pain control. Surgery is indicated at this time to get the fibula out to length and placed the talus in the ankle mortise. Discussed with the patient this would be stage I in a planned II stage procedure. All risks and benefits were discussed in detail with the patient, no guarantees were given or implied. Consent was signed and placed in the patient's chart. The patient's NPO status was confirmed. PROCEDURE IN DETAIL: Following a popliteal block delivered by anesthesia, the patient was brought to the operating room and placed on the operating table in the supine position with a safety belt across her lap. General anesthesia was administered by the anesthesia department without complication. The left lower extremity was prepped, scrubbed, and draped in the usual aseptic fashion. A timeout was performed confirming the correct patient, correct site, correct procedure. Everyone in the room was in agreement. Attention was directed to the left anterior tibia where a small stab incisions was made with a #15 blade in the central tibia, just medial to the tibial crest confirming the appropriate position live fluoroscopy. The pin was advanced bicortically, conforming depth on a lateral radiograph. The process was repeated for the more distal tibial pin. Next, lateral ankle fluoroscopic imaging was utilized to determine the appropriate starting point for a posterior plantar, centrally threaded Schanz pin placement into the calcaneal tuberosity. Once ideal positioning was determined a small stab incision was made in the lateral aspect of the calcaneus utilizing a #15 blade. Next, the Schanz pin was advanced through the stab incision until threads were centered in the posterior tuberosity of the calcaneus. The tibial pins were then connected with the medial and lateral aspects of the calcaneal pin with the corresponding external fixator bars. Next closed manipulative reduction of the bimalleolar ankle fracture/dislocation was performed. Appropriate reduction was confirmed on AP and lateral ankle radiographs utilizing fluoroscopic imaging. All clamps on the external fixator were tightened to assure maintenance of the reduction.   At this time a 5.0mm Schanz pin was placed in a medial to lateral fashion into the medial and intermediate cuneiforms utilizing the same technique as described above. Corresponding external fixator bar was used to connect this midfoot pin with the medial bar. Final fluoroscopic imaging was obtained to ensure maintenance of appropriate anatomic reduction and safe placement of hardware. Dressings were applied consisting of Adaptic, gauze 4 x 4's, ABD, Kerlix, Ace bandages. The patient tolerated above procedure and anesthesia well without complications. The patient was transferred from the operating room to the PACU with vital signs stable and neurovascular intact to the left lower extremity. The patient was transferred back to her room. Discussed with the patient we will likely perform her definitive fixation of the ankle in the next 10-14 days.     Electronically signed by Lion Arita DPM on 2/9/2021 at 9:00 AM

## 2021-02-09 NOTE — PROGRESS NOTES
Physical Therapy    Facility/Department: [facility] MED SURG  Initial Assessment    NAME: Virgilio Bernardo  : 1980  MRN: [id number]    Date of Service: 2021    Discharge Recommendations:  Patient would benefit from continued therapy after discharge, Continue to assess pending progress   PT Equipment Recommendations  Equipment Needed: Yes  Mobility Devices: Jimenez Hallman; Wheelchair;ADL AssistiveDevices  Walker: Rolling  Wheelchair: Light Weight(with elevated leg rest on L)  Other: Pt would benefit from use of RW and w/c upon d/c. Pt will need a w/c for longer distances due to fatigue and WB restrictions with elevated leg rest on L. Assessment   Body structures, Functions, Activity limitations: Decreased functional mobility ; Decreased ADL status; Decreased ROM; Decreased strength;Decreased safe awareness;Decreased endurance;Decreased balance; Increased pain  Assessment: Pt requiring 1 assist for all mobility. Pt educated on use of RW and positioning. Pt emphasized on safety and having assist. Pt would benefit from continued PT upon d/c. Treatment Diagnosis: Impaired functional mobility 2* L ankle fx  Specific instructions for Next Treatment: stair training, gait, w/c mobility, SAFETY  Prognosis: Good  Decision Making: Low Complexity  History: s/p ANKLE EXTERNAL FIXATOR APPLICATION on   Exam: ROM, MMT, bed mobility, transfers, amb, balance, activity tolerance  Clinical Presentation: Pt alert, cooperative, agreeable to PT. Barriers to Learning: pain  REQUIRES PT FOLLOW UP: Yes  Activity Tolerance  Activity Tolerance: Patient limited by pain; Patient Tolerated treatment well       Patient Diagnosis(es): The primary encounter diagnosis was Closed fracture of left ankle, initial encounter. A diagnosis of Post-op pain was also pertinent to this visit. has a past medical history of Anemia, Bimalleolar ankle fracture, left, closed, initial encounter, Chronic midline low back pain without sciatica, Class 1 obesity due to excess calories with body mass index (BMI) of 32.0 to 32.9 in adult, and Psoriasis. has a past surgical history that includes fracture surgery (age 23, 3);  section (2018); Tubal ligation (17); [place] tooth extraction; and Ankle surgery (Left, 2021). Restrictions  Restrictions/Precautions  Restrictions/Precautions: General Precautions, Weight Bearing, Fall Risk  Required Braces or Orthoses?: No  Implants present? : Metal implants(L ankle external fixator)  Lower Extremity Weight Bearing Restrictions  Left Lower Extremity Weight Bearing: Non Weight Bearing  Position Activity Restriction  Other position/activity restrictions: up with assistance  Vision/Hearing  Vision: Within Functional Limits  Hearing: Within functional limits     Subjective  General  Chart Reviewed: Yes  Patient assessed for rehabilitation services?: Yes  Additional Pertinent Hx: external fixation of L ankle on 21  Family / Caregiver Present: No  Referring Practitioner: Colleen Menezes DPM  Referral Date : 21  Diagnosis: Bimalleolar ankle fracture, left  Follows Commands: Within Functional Limits  Subjective  Subjective: Pt in bed, agreeable to PT OT co eval. LLE elevated, pt started to get a discomfort in L hip. PT/OT assisting pt to a more comfortable position.  JUAN Gutiérrez  Pain Screening  Patient Currently in Pain: Yes  Pain Assessment  Pain Assessment: 0-10  Pain Level: 9  Pain Type: Surgical pain  Pain Location: Ankle  Pain Orientation: Left  Pain Radiating Towards: L hip  Pain Descriptors: Pressure;Radiating  Pain Frequency: Continuous  Pain Onset: On-going  Clinical Progression: Gradually worsening  Functional Pain Assessment: Prevents or interferes some active activities and ADLs Rolling to Left: Stand by assistance  Supine to Sit: Stand by assistance  Sit to Supine: Stand by assistance  Scooting: Stand by assistance  Comment: HOB flat, good LLE management. Increased pain with movements. Pt educated on support at heel to reduce strain on hip/pain. Transfers  Sit to Stand: Contact guard assistance  Stand to sit: Contact guard assistance  Bed to Chair: Contact guard assistance  Stand Pivot Transfers: Contact guard assistance  Comment: CGA with device using RW. Demo and cues given for technique. Pt performs STS x4. Ambulation  Ambulation?: Yes  WB Status: NWB LLE  Ambulation 1  Surface: level tile  Device: Rolling Walker  Assistance: Contact guard assistance  Quality of Gait: fast sagar, impulsive, increased lateral sway  Distance: 25', 5'  Comments: Pt is fast moving, decreased safety awareness. Cues on safety and to have assist nearby for all mobility. Stairs/Curb  Stairs?: No     Balance  Posture: Good  Sitting - Static: Good  Sitting - Dynamic: Good  Standing - Static: Fair;+  Standing - Dynamic: Fair  Comments: Fall risk, standing balance with RW. Other exercises  Other exercises?: Yes  Other exercises 1: Education on positioning, use of ice for LLE     Plan   Plan  Times per week: 6-7x/week  Specific instructions for Next Treatment: stair training, gait, w/c mobility, SAFETY  Current Treatment Recommendations: Strengthening, Functional Mobility Training, Balance Training, ROM, Transfer Training, Equipment Evaluation, Education, & procurement, Patient/Caregiver Education & Training, Safety Education & Training, Pain Management, Home Exercise Program, Stair training, Gait Training, Endurance Training, Positioning, Wheelchair Mobility Training  Safety Devices  Type of devices:  All fall risk precautions in place, Call light within reach, Gait belt, Patient at risk for falls, Left in bed, Nurse notified(JUAN Adair, seated EOB with LLE propped)    G-Code       OutComes Score AM-PAC Score  AM-PAC Inpatient Mobility Raw Score : 16 (02/09/21 0845)  AM-PAC Inpatient T-Scale Score : 40.78 (02/09/21 0845)  Mobility Inpatient CMS 0-100% Score: 54.16 (02/09/21 0845)  Mobility Inpatient CMS G-Code Modifier : CK (02/09/21 0845)          Goals  Short term goals  Time Frame for Short term goals: 3-4 days  Short term goal 1: pt to demo IND bed mobility. Short term goal 2: Pt to perform SBA transfers. Short term goal 3: Pt to amb 50'-75' with RW SBA. Short term goal 4: Pt to ascend/descend 3 steps per home entry, CGA. Short term goal 5: Pt to scoot on buttocks 1 flight of steps CGA for assist with LLE prn. Short term goal 6: Pt to improve standing tolerance/balance to GOOD-/FAIR+  Short term goal 7: Pt to propel w/c 75' Mod I on varied surfaces. Patient Goals   Patient goals :  To go home/reduce pain       Therapy Time   Individual Concurrent Group Co-treatment   Time In 0845         Time Out 0915         Minutes 30         Timed Code Treatment Minutes: Dom Bwoser Shoulder, PT

## 2021-02-09 NOTE — DISCHARGE SUMMARY
Discharge Summary  Podiatric Medicine and Surgery    Patient Identification     Kenard Bence is a 41 y.o. female  :  1980  MRN: [id number]  Acct: [de-identified]     Admit date: 2021  Discharge date and time: No discharge date for patient encounter. Admitting Physician: Selma Dye DPM   Discharge Physician: Cooper Floyd DPM     Admission Diagnoses: Bimalleolar ankle fracture, left, closed, initial encounter [S89.229K]  Discharge Diagnoses:   Patient Active Problem List   Diagnosis    Smoker    Family planning    Chronic midline low back pain without sciatica    Class 1 obesity due to excess calories with body mass index (BMI) of 32.0 to 32.9 in adult    Tobacco use    Tobacco abuse counseling    Dry heaves    Consumes alcohol at social events    Bimalleolar ankle fracture, left, closed, initial encounter    Alcohol use       Admission Condition: poor  Discharged Condition: good    Hospital Course     Brief Inpatient Course: Admitted on 2021 12:15 AM for Bimalleolar ankle fracture, left, closed, initial encounter [V87.434T]. Montrell Rodriguez is a 41 y. o. female seen at [facility] left ankle pain secondary to mechanical fall sustained from [email or web address] today.  Patient states she heard an audible pop in the left ankle.  This was followed by sudden onset of severe pain in the inability to bear weight and deformity noted to the left ankle which prompted her to present to the ED shortly after the incident.  Patient denies losing consciousness or hitting her head.  However patient is intoxicated at my time of evaluation. Patient denies pain at any other site in the body. In the ED radiographs of the left ankle were obtained which demonstrated a closed ankle fracture with disruption of the ankle mortise. Patient denies any previous trauma or surgical intervention to the left ankle. Patient denies any dysesthesias to the affected limb.  Patient denies any

## 2021-02-09 NOTE — PROGRESS NOTES
Progress Note  Podiatric Medicine and Surgery     Subjective     CC: left ankle fracture     Patient seen and examined at bedside. No acute events overnight. Afebrile, vital signs stable   No strike through noted to bandage    HPI :  Heather Katz is a 41 y.o. female seen at [facility] for left ankle pain secondary to mechanical fall sustained from  standing height earlier today.  Patient states she heard an audible pop in the left ankle.  This was followed by sudden onset of severe pain in the inability to bear weight and deformity noted to the left ankle which prompted her to present to the ED shortly after the incident.  Patient denies losing consciousness or hitting her head.  However patient is intoxicated at my time of evaluation. Patient denies pain at any other site in the body. In the ED radiographs of the left ankle were obtained which demonstrated a closed ankle fracture with disruption of the ankle mortise. Patient denies any previous trauma or surgical intervention to the left ankle. Patient denies any dysesthesias to the affected limb. Patient denies any drug or alcohol use. Patient admits smoking and marijuana use.       ROS: Denies N/V/F/C/SOB/CP. Otherwise negative except at stated in the HPI.      Medications:  Scheduled Meds:   gabapentin  100 mg Oral BID    prenatal vitamin  1 tablet Oral Daily    sodium chloride flush  10 mL Intravenous 2 times per day    ketorolac  15 mg Intravenous Q6H    enoxaparin  30 mg Subcutaneous BID       Continuous Infusions:    PRN Meds:sodium chloride flush, promethazine **OR** ondansetron, polyethylene glycol, acetaminophen **OR** acetaminophen, oxyCODONE-acetaminophen **OR** oxyCODONE-acetaminophen, morphine    Objective     Vitals:  Patient Vitals for the past 8 hrs:   BP Temp Temp src Pulse Resp SpO2   02/09/21 0741 111/81 97.9 °F (36.6 °C) Oral 89 18 97 %     Average, Min, and Max for last 24 hours Vitals: TEMPERATURE:  Temp  Av.1 °F (36.7 °C)  Min: 94.6 °F (34.8 °C)  Max: 98.4 °F (36.9 °C)    RESPIRATIONS RANGE: Resp  Av.9  Min: 0  Max: 25    PULSE RANGE: Pulse  Av.1  Min: 89  Max: 117    BLOOD PRESSURE RANGE:  Systolic (63AXZ), KPI:203 , Min:82 , ZKZ:795   ; Diastolic (04IAU), RVP:98, Min:45, Max:101      PULSE OXIMETRY RANGE: SpO2  Av.7 %  Min: 96 %  Max: 100 %    I/O last 3 completed shifts: In: 4690 [P.O.:420; I.V.:1110]  Out: 605 [Urine:600; Blood:5]    CBC:  Recent Labs     21  0636   WBC 9.5 7.3   HGB 13.8 11.3*   HCT 41.4 33.7*    265        BMP:  Recent Labs     21  0636    135   K 4.0 4.0   * 103   CO2 21 23   BUN 10 10   CREATININE 0.53 0.48*   GLUCOSE 108* 99   CALCIUM 9.1 8.6        Coags:  Recent Labs     21   APTT 30.0   INR 0.9       No results found for: SEDRATE  No results for input(s): CRP in the last 72 hours. Lower Extremity Physical Exam:  Vascular:  Cap fill time to toes brisk left    Neuro: Light touch sensation is present to the distal digits    Dressing kept intact overlying left delta frame      Clinical Images:  None    Imaging:   FLUORO FOR SURGICAL PROCEDURES   Final Result      CT TIBIA FIBULA LEFT WO CONTRAST   Final Result   Acute traumatic oblique fracture of the distal fibular shaft without   extension of fracture line to the distal syndesmosis but with likely some   small acute avulsion fracture fragments at the level of the distal   syndesmosis in the region of the AITFL. Acute traumatic fracture of the posterior malleolus. Likely some acute small avulsion fracture fragments adjacent to the inferior   aspect of the medial malleolus. There may be a trace tibiotalar joint effusion. There also appears to be a   small osteochondral lesion to the anteromedial talar dome measuring 4 x 5 mm   without discrete in situ fragment identified. Soft tissue swelling and subcutaneous edema about the ankle. XR ANKLE LEFT (MIN 3 VIEWS)   Final Result   1. Stable alignment of posterior ankle dislocation, as discussed above. 2. Mild decreased distraction of posterior malleolar vertical acute fracture. 3. Stable alignment of distal fibular diaphyseal acute oblique fracture. 4. Stable ankle mortise asymmetry consistent with ligamentous disruption. 5. Previously identified small caudal acute fracture fragment associated with   medial malleolus, not well visualized in setting of casting material.         XR TIBIA FIBULA LEFT (2 VIEWS)   Final Result   Fracture-subluxation of the ankle. Fracture of the lower leg. XR FOOT LEFT (MIN 3 VIEWS)   Final Result   Fracture-subluxation of the ankle. Fracture of the lower leg. XR ANKLE LEFT (MIN 3 VIEWS)   Final Result   Fracture-subluxation of the ankle. Fracture of the lower leg. Cultures: None    Assessment   Barbara Mackey is a 41 y.o. female with   1. Left ankle fracture  2. S/P left ankle delta frame application [birth year]    Principal Problem:    Bimalleolar ankle fracture, left, closed, initial encounter  Active Problems:    Chronic midline low back pain without sciatica    Tobacco use    Alcohol use  Resolved Problems:    * No resolved hospital problems.  *       Plan     · Patient examined and evaluated at bedside   · Treatment options discussed in detail with the patient and she understands the importance of strict non-weight bearing to LLE  · Dressing left intact  · PT/OT - walker use, pt able to ambulate without bearing weight LLE  · Plan to discharge today  · Rx oxycodone 10/325, flexeril 10mg to control pain and tension  · Lovenox ordered for BID anti-coagulation therapy  · Strict NWB to LLE  · No dressing changes, leave CDI  · Walker ordered   · Follow up with Jenelle Mccloud in his office in 3 days  · Plan for ORIF 2/26/2021 · Will discuss with Dr. Sarabia [place] was evaluated today and a DME order was entered for a wheeled walker because she requires this to successfully complete daily living tasks of ambulating. A wheeled walker is necessary due to the patient's unsteady gait, upper body weakness, and inability to  an ambulation device; and she can ambulate only by pushing a walker instead of a lesser assistive device such as a cane, crutch, or standard walker. The need for this equipment was discussed with the patient and she understands and is in agreement.     Erinn Rangel DPM   Podiatric Medicine & Surgery   2/9/2021 at 8:35 AM

## 2021-02-09 NOTE — CARE COORDINATION
CSN                                    Req/Control # [Problem retrieving Specimen ID]                                   Order Date:  2021  [id number]                                          Patient Information      Name:  Ankur Varghese  :  1980  Age:  41 y.o. Address:  [address] [place] [address]   Zip:  [address]  PCP: Katya Swanson PA-C Sex:  F  SSN: [id number]  Home Phone: [phone]  Work Phone:    Patient MRN:  [id number]    Alt Patient ID:  [id number]  PCP Phone: [phone]       Authorizing Provider Information       AUTHORIZING PROVIDER: Rickey Barnes DPM  Physician ID: [id number]  NPI:  [id number]  Site:   Address: [address] [place] [address]  Phone: [phone]  Fax: [phone]              EQUIPMENT ORDERED  DME Order for Charlee Verma (ORD   #:   [id number]) Priority  STAT Class  Hospital Performed        Associated Diagnosis:          Comments:    You must complete the order parameters below and add the medical necessity documentation for this DME in a separate note.     Folding Walker with Wheels     Current patient weight: Weight: 223 lb (101.2 kg)  Current patient height: Height: 5' 9\" (175.3 cm)  Diagnosis: left ankle fracture  Duration: Purchase            Scheduling Instructions:                                 Specimen Source             Collection Date    Collection Time    Order Status    Expected Date                Electronically Signed By  Rickey Barnes DPM Date  2021           Responsible Party Theadora Brittle Information Guar-ActID   Relationship Account Type Home Phone   [employer]ENTLEY MOSES - 1* [address] [place] [address] / [address] P/F [phone]   Employer   Work Phone   [employer]              [address] .  Insurance/Subscriber ID:  [id number]  [facility] [address] Name:  Alison Li Relationship to Patient: SelfSigned ABN: N    Payor Name:  [address] [place] [address] Avenue:  [place] [address] PLAN   Group:   Worker's Comp Date of Injury:

## 2021-02-09 NOTE — PROGRESS NOTES
[address]   Occupational Therapy Evaluation  Date: 21  Patient Name: Ambreen Osman       Room: 8898/9026-54  MRN: [id number]  Account: [de-identified]   : 1980  (41 y.o.) Gender: female     Discharge Recommendations: The patient may need non-skilled ADL assistance after discharge. OT Equipment Recommendations  Equipment Needed: (TBD)    Referring Practitioner: Karley Mcghee DPM  Diagnosis: Bimalleolar ankle fracture, left, closed  Additional Pertinent Hx: s/p ANKLE EXTERNAL FIXATOR APPLICATION on 60    Treatment Diagnosis: impaired self care status  Past Medical History:  has a past medical history of Anemia, Bimalleolar ankle fracture, left, closed, initial encounter, Chronic midline low back pain without sciatica, Class 1 obesity due to excess calories with body mass index (BMI) of 32.0 to 32.9 in adult, and Psoriasis. Past Surgical History:   has a past surgical history that includes fracture surgery (age 23, 3);  section (2018); Tubal ligation (17); [place] tooth extraction; and Ankle surgery (Left, 2021). Restrictions  Restrictions/Precautions: General Precautions, Weight Bearing, Fall Risk  Implants present? : Metal implants(L ankle external fixator)  Other position/activity restrictions: up with assistance  Left Lower Extremity Weight Bearing: Non Weight Bearing  Required Braces or Orthoses?: No       Subjective  Subjective: \"the block is starting to wear off, It's starting to get really painful\"  Comments: RN ok'd OT/PT Evaluation this date. Patient agreeable to therapy, however painful.   Overall Orientation Status: Within Normal Limits  Vision  Vision: Within Functional Limits  Hearing  Hearing: Within functional limits  Social/Functional History  Lives With: Family(17 y/o daughter, 14 y/o son, 3 y/o baby)  Type of Home: House  Home Layout: Two level, Laundry in basement(could sleep on first floor, no bathroom 1st floor) LUE Tone: Normotonic     LUE AROM (degrees)  LUE AROM : WFL     Left Hand AROM (degrees)  Left Hand AROM: WFL  RUE Strength  Gross RUE Strength: WFL      RUE Tone: Normotonic     RUE AROM (degrees)  RUE AROM : WFL     Right Hand AROM (degrees)  Right Hand AROM: WFL    Fine Motor Skills  Coordination  Movements Are Fluid And Coordinated:  Yes                           Mobility  Supine to Sit: Stand by assistance  Sit to Supine: Stand by assistance       Balance  Sitting Balance: Independent(seated edge of bed)  Standing Balance: Contact guard assistance     Functional Mobility  Functional - Mobility Device: Rolling Walker  Activity: Other(within hospital room)  Assist Level: Contact guard assistance  Functional Mobility Comments: able to maintain NWB LLE, Good use of RW  Bed mobility  Rolling to Left: Stand by assistance  Supine to Sit: Stand by assistance  Sit to Supine: Stand by assistance  Scooting: Stand by assistance     Transfers  Sit to stand: Stand by assistance  Stand to sit: Stand by assistance  Transfer Comments: from edge of bed, verbal cues for technique with rolling walker and maintaining NWB precautions      Assessment  Performance deficits / Impairments: Decreased functional mobility , Decreased ADL status, Decreased safe awareness, Decreased balance, Decreased endurance  Treatment Diagnosis: impaired self care status  Prognosis: Good  Decision Making: Low Complexity  REQUIRES OT FOLLOW UP: Yes  Activity Tolerance: Patient limited by fatigue, Patient limited by pain         Functional Outcome Measures  AM-PAC Daily Activity Inpatient   How much help for putting on and taking off regular lower body clothing?: A Little  How much help for Bathing?: A Little  How much help for Toileting?: A Little  How much help for putting on and taking off regular upper body clothing?: A Little  How much help for taking care of personal grooming?: A Little  How much help for eating meals?: A Little AM-PAC Inpatient Daily Activity Raw Score: 18  AM-PAC Inpatient ADL T-Scale Score : 38.66  ADL Inpatient CMS 0-100% Score: 46.65  ADL Inpatient CMS G-Code Modifier : CK       Goals  Short term goals  Time Frame for Short term goals: by discharge, patient will  Short term goal 1: perform functional transfers/mobility using RW with SBA while maintaining NWB LLE with Good safety  Short term goal 2: verbalize/demonstrate Good understanding of NWB LLE during standing/mobility/ADLs  Short term goal 3: perform toileting routine with SBA using Good safety techniques    Plan  Safety Devices  Safety Devices in place: Yes  Type of devices: Bed alarm in place, Call light within reach, Left in bed, Patient at risk for falls, Gait belt, Nurse notified     Plan  Times per week: 3  Current Treatment Recommendations: Endurance Training, Patient/Caregiver Education & Training, Equipment Evaluation, Education, & procurement, Self-Care / ADL, Balance Training, Pain Management, Home Management Training, Functional Mobility Training, Safety Education & Training, Positioning  OT Education  OT Education: OT Role, Plan of Care, Precautions, ADL Adaptive Strategies, Transfer Training, Equipment, Orientation  Patient Education: safety, activity promotion, mobility-precautions    OT Equipment Recommendations  Equipment Needed: (TBD)  OT Individual Minutes  Time In: 0845  Time Out: 1890  Minutes: 32    Electronically signed by Estefany Ibarra OT on 2/9/21 at 2:19 PM EST

## 2021-02-09 NOTE — DISCHARGE INSTR - COC
Continuity of Care Form    Patient Name: Robbi Tillman   :  1980  MRN:  [id number]    Admit date:  2021  Discharge date:  ***    Code Status Order: Full Code   Advance Directives:   Advance Care Flowsheet Documentation       Date/Time Healthcare Directive Type of Healthcare Directive Copy in [address] Agent's Name Healthcare Agent's Phone Number    21 1824  No, patient does not have an advance directive for healthcare treatment -- -- -- -- --            Admitting Physician:  Kelsy Villegas DPM  PCP: Earl Taylor PA-C    Discharging Nurse: [facility] Unit/Room#: 4117/4825-85  Discharging Unit Phone Number: ***    Emergency Contact:   Extended Emergency Contact Information  Primary Emergency Contact: [facility]  Address: [address] [place] [address] Phone: [phone]  Relation: Other    Past Surgical History:  Past Surgical History:   Procedure Laterality Date     SECTION  2018    FRACTURE SURGERY  age 23,     Right little finger pins where ?     TUBAL LIGATION  17    WISDOM TOOTH EXTRACTION      Upper level       Immunization History:   Immunization History   Administered Date(s) Administered    Tdap (Boostrix, Adacel) 2014       Active Problems:  Patient Active Problem List   Diagnosis Code    Smoker F17.200    Family planning Z30.09    Chronic midline low back pain without sciatica M54.5, G89.29    Class 1 obesity due to excess calories with body mass index (BMI) of 32.0 to 32.9 in adult E66.09, Z68.32    Tobacco use Z72.0    Tobacco abuse counseling Z71.6    Dry heaves R11.10    Consumes alcohol at social events Z78.9    Bimalleolar ankle fracture, left, closed, initial encounter R17.351F    Alcohol use Z72.89       Isolation/Infection:   Isolation            No Isolation          Patient Infection Status       None to display Nurse Assessment:  Last Vital Signs: /81   Pulse 89   Temp 97.9 °F (36.6 °C) (Oral)   Resp 18   Ht 5' 9\" (1.753 m)   Wt 223 lb (101.2 kg)   SpO2 97%   BMI 32.93 kg/m²     Last documented pain score (0-10 scale): Pain Level: 8  Last Weight:   Wt Readings from Last 1 Encounters:   21 223 lb (101.2 kg)     Mental Status:  {IP PT MENTAL STATUS::::0}    IV Access:  { CHI IV ACCESS:776493988:::0}    Nursing Mobility/ADLs:  Walking   {CHP DME ADLs:331706989:::0}  Transfer  {CHP DME ADLs:191059420:::0}  Bathing  {CHP DME ADLs:900049642:::0}  Dressing  {CHP DME ADLs:480848958:::0}  Toileting  {CHP DME ADLs:014711556:::0}  Feeding  {CHP DME ADLs:846559939:::0}  Med Admin  {CHP DME ADLs:998834972:::0}  Med Delivery   { CHI MED Delivery:988707447:::0}    Wound Care Documentation and Therapy:        Elimination:  Continence: Bowel: {YES / HK:41824}  Bladder: {YES / L}  Urinary Catheter: {Urinary Catheter:269742819:::0}   Colostomy/Ileostomy/Ileal Conduit: {YES / SL:67412}       Date of Last BM: ***    Intake/Output Summary (Last 24 hours) at 2021 1015  Last data filed at 2021 0616  Gross per 24 hour   Intake 1530 ml   Output 605 ml   Net 925 ml     I/O last 3 completed shifts: In: 2602 [P.O.:420;  I.V.:1110]  Out: 605 [Urine:600; Blood:5]    Safety Concerns:     [employer] Safety Concerns:378118601:::0}    Impairments/Disabilities:      [employer] Impairments/Disabilities:841368345:::0}    Nutrition Therapy:  Current Nutrition Therapy:   [employer] Diet List:061201867:::0}    Routes of Feeding: {CHP DME Other Feedings:008245330:::0}  Liquids: {Slp liquid thickness:59468}  Daily Fluid Restriction: {CHP DME Yes amt example:166110031:::0}  Last Modified Barium Swallow with Video (Video Swallowing Test): {Done Not Done LPIL:301146670:::1}    Treatments at the Time of Hospital Discharge:   Respiratory Treatments: ***  Oxygen Therapy:  {Therapy; copd oxygen:68161:::0}  Ventilator: [address] [employer] Vent List:190632765:::0}    Rehab Therapies: {THERAPEUTIC INTERVENTION:3109684831}  Weight Bearing Status/Restrictions: { CC Weight Bearin:::0}  Other Medical Equipment (for information only, NOT a DME order):  {EQUIPMENT:163394369}  Other Treatments: ***    Patient's personal belongings (please select all that are sent with patient):  {CHP DME Belongings:921017794:::0}    RN SIGNATURE:  {Esignature:004258588:::0}    CASE MANAGEMENT/SOCIAL WORK SECTION    Inpatient Status Date: ***    Readmission Risk Assessment Score:  Readmission Risk              Risk of Unplanned Readmission:        6           Discharging to Facility/ Agency   Name:   Address:  Phone:  Fax:    Dialysis Facility (if applicable)   Name:  Address:  Dialysis Schedule:  Phone:  Fax:    / signature: {Esignature:482536651:::0}    PHYSICIAN SECTION    Prognosis: {Prognosis:1903213915:::0}    Condition at Discharge: [address] [employer] Patient Condition:776563449:::0}    Rehab Potential (if transferring to Rehab):     Recommended Labs or Other Treatments After Discharge: post op pain prescriptions ordered oxycodone 10/325, flexeril 41JA    Physician Certification: I certify the above information and transfer of Nayla Lu  is necessary for the continuing treatment of the diagnosis listed and that she requires {Admit to Appropriate Level of Care:41778:::0} for {GREATER/LESS:898336188} 30 days.      Update Admission H&P: {CHP DME Changes in HandP:315136164:::0}    PHYSICIAN SIGNATURE:  {Esignature:079315020:::0}

## 2021-02-09 NOTE — CARE COORDINATION
ONGOING DISCHARGE PLAN:    Spoke with patient regarding discharge plan and patient confirms that plan is still home. POD#1 left ankle external fixator. Walker order and facesheet faxed to [facility] and asked for delivery ASAP for discharge home today. Discussed with pt that PT recommends W/C. Pt states she does not have a ramp into her home and does not plan to be going anywhere that she would need a w/c. Does not want us to get one ordered for her. Will continue to follow for additional discharge needs.     Electronically signed by Karla Hutchinson RN on 2/9/2021 at 11:32 AM

## 2021-02-15 ENCOUNTER — OFFICE VISIT (OUTPATIENT)
Dept: PODIATRY | Age: 41
End: 2021-02-15

## 2021-02-15 ENCOUNTER — HOSPITAL ENCOUNTER (OUTPATIENT)
Dept: LAB | Age: 41
Setting detail: SPECIMEN
Discharge: HOME | End: 2021-02-15
Payer: COMMERCIAL

## 2021-02-15 VITALS — BODY MASS INDEX: 32.46 KG/M2 | HEIGHT: 70 IN

## 2021-02-15 DIAGNOSIS — S82.832A OTHER CLOSED FRACTURE OF DISTAL END OF LEFT FIBULA, INITIAL ENCOUNTER: Primary | ICD-10-CM

## 2021-02-15 DIAGNOSIS — M25.572 ACUTE LEFT ANKLE PAIN: ICD-10-CM

## 2021-02-15 DIAGNOSIS — R60.0 EDEMA OF LOWER EXTREMITY: ICD-10-CM

## 2021-02-15 DIAGNOSIS — Z74.09 PROLONGED IMMOBILIZATION: ICD-10-CM

## 2021-02-15 DIAGNOSIS — S82.392A CLOSED FRACTURE OF POSTERIOR MALLEOLUS OF LEFT TIBIA, INITIAL ENCOUNTER: ICD-10-CM

## 2021-02-15 DIAGNOSIS — S93.05XA DISLOCATION OF LEFT ANKLE JOINT, INITIAL ENCOUNTER: ICD-10-CM

## 2021-02-15 DIAGNOSIS — Z91.89 AT HIGH RISK FOR DEEP VENOUS THROMBOSIS: ICD-10-CM

## 2021-02-15 DIAGNOSIS — Z01.818 PRE-OP TESTING: Primary | ICD-10-CM

## 2021-02-15 PROCEDURE — 99024 POSTOP FOLLOW-UP VISIT: CPT | Performed by: PODIATRIST

## 2021-02-15 PROCEDURE — U0003 INFECTIOUS AGENT DETECTION BY NUCLEIC ACID (DNA OR RNA); SEVERE ACUTE RESPIRATORY SYNDROME CORONAVIRUS 2 (SARS-COV-2) (CORONAVIRUS DISEASE [COVID-19]), AMPLIFIED PROBE TECHNIQUE, MAKING USE OF HIGH THROUGHPUT TECHNOLOGIES AS DESCRIBED BY CMS-2020-01-R: HCPCS

## 2021-02-15 PROCEDURE — 1111F DSCHRG MED/CURRENT MED MERGE: CPT | Performed by: PODIATRIST

## 2021-02-15 PROCEDURE — G8427 DOCREV CUR MEDS BY ELIG CLIN: HCPCS | Performed by: PODIATRIST

## 2021-02-15 PROCEDURE — 4004F PT TOBACCO SCREEN RCVD TLK: CPT | Performed by: PODIATRIST

## 2021-02-15 PROCEDURE — G8419 CALC BMI OUT NRM PARAM NOF/U: HCPCS | Performed by: PODIATRIST

## 2021-02-15 PROCEDURE — G8484 FLU IMMUNIZE NO ADMIN: HCPCS | Performed by: PODIATRIST

## 2021-02-15 PROCEDURE — U0005 INFEC AGEN DETEC AMPLI PROBE: HCPCS

## 2021-02-15 RX ORDER — RIVAROXABAN 10 MG/1
10 TABLET, FILM COATED ORAL
Qty: 30 TABLET | Refills: 1 | Status: SHIPPED | OUTPATIENT
Start: 2021-02-15 | End: 2021-06-01 | Stop reason: ALTCHOICE

## 2021-02-15 ASSESSMENT — ENCOUNTER SYMPTOMS
NAUSEA: 0
COLOR CHANGE: 0
DIARRHEA: 0
SHORTNESS OF BREATH: 0
BACK PAIN: 0

## 2021-02-15 NOTE — PROGRESS NOTES
[facility]  Return Patient Progress Note    Subjective: Montrell Hernandez 41 y.o. female that presents for follow up evaluation of fracture to the left ankle. Chief Complaint   Patient presents with    Post-Op Check     post op initial left ankle     Patient's treatment thus far has included delta frame application and RICE. Pain is rated 5 out of 10 and is described as intermittent. Patient has been following my prescribed course of therapy as instructed. Patient was given Lovenox for DVT prophylaxis and she is asking if there is an oral medication she can take for this instead. Review of Systems   Constitutional: Negative for activity change, appetite change, chills, diaphoresis, fatigue and fever. Respiratory: Negative for shortness of breath. Cardiovascular: Negative for leg swelling. Gastrointestinal: Negative for diarrhea and nausea. Endocrine: Negative for cold intolerance, heat intolerance and polyuria. Musculoskeletal: Positive for gait problem and joint swelling. Negative for arthralgias, back pain and myalgias. Skin: Negative for color change, pallor, rash and wound. Allergic/Immunologic: Negative for environmental allergies and food allergies. Neurological: Negative for dizziness, weakness, light-headedness and numbness. Hematological: Does not bruise/bleed easily. Psychiatric/Behavioral: Negative for behavioral problems, confusion and self-injury. The patient is not nervous/anxious. Objective: Clinical evaluation of the patient reveals adequate alignment to the left ankle. External fixation is intact and stable. There is edema remaining to the left foot and ankle, but this has decreased since last visit. There is no erythema or calor noted to the left foot or ankle. No signs of bacterial infection are noted to any of the pin sites. Assessment:    Diagnosis Orders   1.  Other closed fracture of distal end of left fibula, initial encounter  rivaroxaban (XARELTO) 10 MG TABS tablet   2. Dislocation of left ankle joint, initial encounter  rivaroxaban (XARELTO) 10 MG TABS tablet   3. Closed fracture of posterior malleolus of left tibia, initial encounter  rivaroxaban (XARELTO) 10 MG TABS tablet   4. Edema of lower extremity  rivaroxaban (XARELTO) 10 MG TABS tablet   5. Acute left ankle pain  rivaroxaban (XARELTO) 10 MG TABS tablet   6. Prolonged immobilization  rivaroxaban (XARELTO) 10 MG TABS tablet   7. At high risk for deep venous thrombosis  rivaroxaban (XARELTO) 10 MG TABS tablet         Plan: 1. Clinical evaluation of the patient. 2. The left lower extremity pin sites were dressed with antibiotic ointment and a DSD. Ace wraps were reapplied to the left lower extremity. Patient to remain NWB to the left lower extremity. Patient was instructed on proper rest, ice, compression, and elevation of the left lower extremity. Patient given a prescription for Xarelto as they are at high risk of developing a DVT. Patient informed that if her insurance will not cover the cost of this medication, then she is to take  mg daily. 3. Return if symptoms worsen or fail to improve, for Patient scheduled for surgery ORIF ankle.    2/15/2021      Dewey Canela DPM

## 2021-02-16 ENCOUNTER — APPOINTMENT (OUTPATIENT)
Dept: GENERAL RADIOLOGY | Age: 41
End: 2021-02-16
Payer: COMMERCIAL

## 2021-02-16 ENCOUNTER — HOSPITAL ENCOUNTER (EMERGENCY)
Age: 41
Discharge: HOME | End: 2021-02-16
Attending: EMERGENCY MEDICINE
Payer: COMMERCIAL

## 2021-02-16 VITALS
SYSTOLIC BLOOD PRESSURE: 111 MMHG | HEART RATE: 98 BPM | DIASTOLIC BLOOD PRESSURE: 76 MMHG | TEMPERATURE: 98.1 F | HEIGHT: 70 IN | OXYGEN SATURATION: 95 % | WEIGHT: 213 LBS | RESPIRATION RATE: 16 BRPM | BODY MASS INDEX: 30.49 KG/M2

## 2021-02-16 DIAGNOSIS — M25.572 ACUTE LEFT ANKLE PAIN: Primary | ICD-10-CM

## 2021-02-16 DIAGNOSIS — G89.18 POST-OP PAIN: ICD-10-CM

## 2021-02-16 LAB
SARS-COV-2: NORMAL
SARS-COV-2: NOT DETECTED
SOURCE: NORMAL

## 2021-02-16 PROCEDURE — 73610 X-RAY EXAM OF ANKLE: CPT

## 2021-02-16 PROCEDURE — 6360000002 HC RX W HCPCS: Performed by: STUDENT IN AN ORGANIZED HEALTH CARE EDUCATION/TRAINING PROGRAM

## 2021-02-16 PROCEDURE — 96372 THER/PROPH/DIAG INJ SC/IM: CPT

## 2021-02-16 PROCEDURE — 99283 EMERGENCY DEPT VISIT LOW MDM: CPT

## 2021-02-16 RX ORDER — KETOROLAC TROMETHAMINE 30 MG/ML
30 INJECTION, SOLUTION INTRAMUSCULAR; INTRAVENOUS ONCE
Status: DISCONTINUED | OUTPATIENT
Start: 2021-02-16 | End: 2021-02-16 | Stop reason: HOSPADM

## 2021-02-16 RX ORDER — MORPHINE SULFATE 4 MG/ML
4 INJECTION, SOLUTION INTRAMUSCULAR; INTRAVENOUS ONCE
Status: COMPLETED | OUTPATIENT
Start: 2021-02-16 | End: 2021-02-16

## 2021-02-16 RX ORDER — OXYCODONE AND ACETAMINOPHEN 10; 325 MG/1; MG/1
1 TABLET ORAL EVERY 8 HOURS PRN
Qty: 6 TABLET | Refills: 0 | Status: ON HOLD | OUTPATIENT
Start: 2021-02-16 | End: 2021-02-19 | Stop reason: HOSPADM

## 2021-02-16 RX ADMIN — MORPHINE SULFATE 4 MG: 4 INJECTION INTRAVENOUS at 11:21

## 2021-02-16 ASSESSMENT — ENCOUNTER SYMPTOMS
COUGH: 0
NAUSEA: 0
ABDOMINAL PAIN: 0
SHORTNESS OF BREATH: 0
CHEST TIGHTNESS: 0
CONSTIPATION: 0
VOMITING: 0
SORE THROAT: 0
BACK PAIN: 0
COLOR CHANGE: 0
DIARRHEA: 0
WHEEZING: 0

## 2021-02-16 ASSESSMENT — PAIN DESCRIPTION - LOCATION: LOCATION: ANKLE

## 2021-02-16 ASSESSMENT — PAIN SCALES - GENERAL: PAINLEVEL_OUTOF10: 10

## 2021-02-16 ASSESSMENT — PAIN DESCRIPTION - ORIENTATION: ORIENTATION: LEFT

## 2021-02-16 NOTE — CONSULTS
Consultation Note  Podiatric Medicine and Surgery     Subjective     Chief Complaint: Right ankle pain    HPI:  Niharika Sotelo is a 41 y.o. female seen at [facility] for evaluation of right ankle pain. The patient is well known to our service and is s/p external fixation for a closed bimalleolar ankle fracture (DOS: 2021). The patient states she was just seen for follow up by Dr. Teja Lama yesterday in the office. The patient relates intermittent paresthesias to the right ankle. The patient states she has been compliant with NWB instructions and has been using ice and elevation as instructed. The patient denies any new acute falls or trauma. PCP is Emi Mullen PA-C    ROS:   Review of Systems   Constitutional: Negative for chills, fatigue and fever. HENT: Negative for congestion and sore throat. Respiratory: Negative for cough, shortness of breath and wheezing. Cardiovascular: Negative for chest pain and leg swelling. Gastrointestinal: Negative for diarrhea, nausea and vomiting. Endocrine: Negative for cold intolerance and heat intolerance. Genitourinary: Negative for difficulty urinating and dysuria. Musculoskeletal: Positive for arthralgias, gait problem, joint swelling and myalgias. Negative for neck stiffness. Skin: Negative for color change, rash and wound. Neurological: Positive for numbness. Negative for dizziness, weakness and headaches. Psychiatric/Behavioral: Negative for agitation and behavioral problems. Past Medical History   has a past medical history of Anemia, Bimalleolar ankle fracture, left, closed, initial encounter, Chronic midline low back pain without sciatica, Class 1 obesity due to excess calories with body mass index (BMI) of 32.0 to 32.9 in adult, and Psoriasis. Past Surgical History   has a past surgical history that includes fracture surgery (age 23, 3);  section (2018); Tubal ligation (17);  [place] tooth extraction; and Ankle surgery (Left, 2/8/2021). Medications  Prior to Admission medications    Medication Sig Start Date End Date Taking? Authorizing Provider   oxyCODONE-acetaminophen (PERCOCET)  MG per tablet Take 1 tablet by mouth every 8 hours as needed for Pain (Take every 4-6 hours as needed for post-op pain) for up to 3 days. Intended supply: 30 days 2/16/21 2/19/21 Yes Gina Michaud,    rivaroxaban (XARELTO) 10 MG TABS tablet Take 1 tablet by mouth daily (with breakfast) 2/15/21   Mary Kay Dias DPM   enoxaparin (LOVENOX) 30 MG/0.3ML injection Inject 0.3 mLs into the skin 2 times daily 2/9/21   Lizzette Morales DPM   cyclobenzaprine (FLEXERIL) 5 MG tablet Take 2 tablets by mouth 2 times daily as needed for Muscle spasms 2/9/21 2/19/21  Lizzette Morales DPM   ibuprofen (ADVIL;MOTRIN) 600 MG tablet Take 1 tablet by mouth every 6 hours as needed for Pain. Patient not taking: Reported on 2/15/2021 10/25/14   Enoc Meadows MD    Scheduled Meds:   ketorolac  30 mg Intramuscular Once     Continuous Infusions:  PRN Meds:. Allergies  has No Known Allergies. Family History  family history includes Breast Cancer in an other family member; Cervical Cancer in her maternal grandmother; Colon Cancer in her maternal grandfather; Emphysema in her paternal grandmother; High Cholesterol in her mother; Wallene Snellen in her father; Mult Sclerosis in her maternal aunt. Social History   reports that she has been smoking cigarettes. She started smoking about 26 years ago. She has a 12.50 pack-year smoking history. She has never used smokeless tobacco.   reports current alcohol use of about 0.8 standard drinks of alcohol per week. reports current drug use. Drug: Marijuana.     Objective     Vitals:  Patient Vitals for the past 8 hrs:   BP Temp Temp src Pulse Resp SpO2 Height Weight   02/16/21 1042 111/76 98.1 °F (36.7 °C) Temporal 98 16 95 % 5' 10\" (1.778 m) 213 lb (96.6 kg)     Average, Min, and *  Resolved Problems:    * No resolved hospital problems. *        Plan     · Patient examined and evaluated at bedside. · Treatment options discussed in detail with the patient. · Radiographs reviewed and discussed in detail with the patient. · No new acute fractures or dislocations. Tibio-talar alignment grossly stable. · Dressing applied to Right foot: Betadine soaked 4x4s to pin sites, DSD, ACE. · Patient scheduled for formal ORIF of the Right ankle 2/19/2021. · Strict NWB to Right lower extremity. · Discussed with Dr. Nanci Baig.       Electronically signed by Gilbert Bates DPM on 2/16/2021 at 12:51 PM

## 2021-02-16 NOTE — ED PROVIDER NOTES
1 [facility]     Emergency Department     Faculty Attestation    I performed a history and physical examination of the patient and discussed management with the resident. I have reviewed and agree with the residents findings including all diagnostic interpretations, and treatment plans as written at the time of my review. Any areas of disagreement are noted on the chart. I was personally present for the key portions of any procedures. I have documented in the chart those procedures where I was not present during the key portions. For Physician Assistant/ Nurse Practitioner cases/documentation I have personally evaluated this patient and have completed at least one if not all key elements of the E/M (history, physical exam, and MDM). Additional findings are as noted. This patient was evaluated in the Emergency Department for symptoms described in the history of present illness. The patient was evaluated in the context of the global COVID-19 pandemic, which necessitated consideration that the patient might be at risk for infection with the SARS-CoV-2 virus that causes COVID-19. Institutional protocols and algorithms that pertain to the evaluation of patients at risk for COVID-19 are in a state of rapid change based on information released by regulatory bodies including the CDC and federal and state organizations. These policies and algorithms were followed during the patient's care in the ED. Primary Care Physician: Boo Milton PA-C    History: This is a 41 y.o. female who presents to the Emergency Department with complaint of left ankle pain. The patient had an ORIF of the left ankle by podiatry at [facility]. She had an appointment with her podiatrist yesterday and states she was doing well until she went home. Since then she has been having significant pain and feeling cold in the left foot.   She took 2 Percocets at midnight and another dose at 4 AM without any improvement of her symptoms. She denies any trauma. Physical:   height is 5' 10\" (1.778 m) and weight is 213 lb (96.6 kg). Her temporal temperature is 98.1 °F (36.7 °C). Her blood pressure is 111/76 and her pulse is 98. Her respiration is 16 and oxygen saturation is 95%. There is obvious external fixator noted. Toes are warm to the touch with good capillary refill sensation light touch intact. Impression: Ankle pain, status post ORIF    Plan: Analgesia, podiatry consultation    (Please note that portions of this note were completed with a voice recognition program.  Efforts were made to edit the dictations but occasionally words are mis-transcribed.)    Nimisha Gann.  Destiny Nicolas MD, [address] [facility]  Attending Emergency Medicine Physician        Prem Gifford MD  02/16/21 4279

## 2021-02-16 NOTE — ED PROVIDER NOTES
[facility]  ED  Emergency Department Encounter  Emergency Medicine Resident     Pt Name: Costa Jameson  MRN: [id number]  [place] 1980  Date of evaluation: 21  PCP:  Judy Ryan Dr       Chief Complaint   Patient presents with    Ankle Pain     L ankle, immobilized from surgery 2021       HISTORY OFPRESENT ILLNESS  (Location/Symptom, Timing/Onset, Context/Setting, Quality, Duration, Modifying [facility].)      Costa Jameson is a 41 y.o. female who presents with left ankle pain. Patient had an exfix placed back on the eighth after she slipped and fell on the ice with try to get out of the car. Patient states that since dressing change yesterday she has felt like the pins inside her cold and extremely painful. This is not improved with Percocet which she took 2 at midnight and 1 at 4 AM without significant improvement. Patient has not noticed any moving or has not bumped the external fixator since yesterday. The pain started shortly after she got home from her appointment yesterday. Patient has not been able to sleep secondary to pain. PAST MEDICAL / SURGICAL / SOCIAL / FAMILY HISTORY      has a past medical history of Anemia, Bimalleolar ankle fracture, left, closed, initial encounter, Chronic midline low back pain without sciatica, Class 1 obesity due to excess calories with body mass index (BMI) of 32.0 to 32.9 in adult, and Psoriasis. has a past surgical history that includes fracture surgery (age 23, 3);  section (2018); Tubal ligation (17); [place] tooth extraction; and Ankle surgery (Left, 2021).      Social History     Socioeconomic History    Marital status: Single     Spouse name: Not on file    Number of children: Not on file    Years of education: Not on file    Highest education level: Not on file   Occupational History    Not on file   Social Needs    Financial resource strain: Not on file    Food insecurity     Worry: Not on file     Inability: Not on file    Transportation needs     Medical: Not on file     Non-medical: Not on file   Tobacco Use    Smoking status: Current Every Day Smoker     Packs/day: 0.50     Years: 25.00     Pack years: 12.50     Types: Cigarettes     Start date: 3/5/1994    Smokeless tobacco: Never Used    Tobacco comment: 1/2 pack daily as of 3/5/19   Substance and Sexual Activity    Alcohol use:  Yes     Alcohol/week: 0.8 standard drinks     Types: 1 Standard drinks or equivalent per week     Frequency: 2-4 times a month     Comment: 8-12 beers on  as of 3/    Drug use: Yes     Types: Marijuana    Sexual activity: Yes     Partners: Male     Birth control/protection: Surgical   Lifestyle    Physical activity     Days per week: Not on file     Minutes per session: Not on file    Stress: Not on file   Relationships    Social connections     Talks on phone: Not on file     Gets together: Not on file     Attends [employer]ism service: Not on file     Active member of club or organization: Not on file     Attends meetings of clubs or organizations: Not on file     Relationship status: Not on file    Intimate partner violence     Fear of current or ex partner: Not on file     Emotionally abused: Not on file     Physically abused: Not on file     Forced sexual activity: Not on file   Other Topics Concern    Not on file   Social History Narrative    Not on file       Family History   Problem Relation Age of Onset    Colon Cancer Maternal Grandfather         60-70s    Breast Cancer Other         maternal great aunt age of onset uncertain    High Cholesterol Mother     Lung Cancer Father     Cervical Cancer Maternal Grandmother     Emphysema Paternal Grandmother     Mult Sclerosis Maternal Aunt     Cancer Neg Hx     Diabetes Neg Hx     Eclampsia Neg Hx     Hypertension Neg Hx     Ovarian Cancer Neg Hx      Labor Neg Hx     Spont Abortions Neg Hx     Stroke Neg Hx         Allergies:  Patient has no known allergies. Home Medications:  Prior to Admission medications    Medication Sig Start Date End Date Taking? Authorizing Provider   oxyCODONE-acetaminophen (PERCOCET)  MG per tablet Take 1 tablet by mouth every 8 hours as needed for Pain (Take every 4-6 hours as needed for post-op pain) for up to 3 days. Intended supply: 30 days 2/16/21 2/19/21 Yes Riley Cervantes DO Keily   rivaroxaban (XARELTO) 10 MG TABS tablet Take 1 tablet by mouth daily (with breakfast) 2/15/21   Kim Lboato DPM   enoxaparin (LOVENOX) 30 MG/0.3ML injection Inject 0.3 mLs into the skin 2 times daily 2/9/21   Davis Baum DPM   cyclobenzaprine (FLEXERIL) 5 MG tablet Take 2 tablets by mouth 2 times daily as needed for Muscle spasms 2/9/21 2/19/21  Davis Baum DPM   ibuprofen (ADVIL;MOTRIN) 600 MG tablet Take 1 tablet by mouth every 6 hours as needed for Pain. Patient not taking: Reported on 2/15/2021 10/25/14   Vin Guerra MD       REVIEW OFSYSTEMS    (2-9 systems for level 4, 10 or more for level 5)      Review of Systems   Constitutional: Negative for chills, diaphoresis, fatigue and fever. Respiratory: Negative for cough, chest tightness, shortness of breath and wheezing. Cardiovascular: Negative for chest pain, palpitations and leg swelling. Gastrointestinal: Negative for abdominal pain, constipation, diarrhea, nausea and vomiting. Genitourinary: Negative for difficulty urinating, dysuria and urgency. Musculoskeletal: Positive for arthralgias (Left ankle). Negative for back pain, neck pain and neck stiffness. Skin: Negative for color change, pallor and rash. Neurological: Negative for dizziness, weakness, light-headedness and headaches.        PHYSICAL EXAM   (up to 7 for level 4, 8 or more forlevel 5)      INITIAL VITALS:   ED Triage Vitals [02/16/21 1042]   BP Temp Temp Source Pulse Resp SpO2 Height Weight   111/76 98.1 °F (36.7 °C) Temporal 98 16 95 % 5' 10\" (1.778 m) 213 lb (96.6 kg)       Physical Exam  Vitals signs and nursing note reviewed. Constitutional:       General: She is not in acute distress. Appearance: She is well-developed. She is not diaphoretic. HENT:      Head: Normocephalic and atraumatic. Eyes:      General: No scleral icterus. Conjunctiva/sclera: Conjunctivae normal.      Pupils: Pupils are equal, round, and reactive to light. Neck:      Musculoskeletal: Normal range of motion and neck supple. Vascular: No JVD. Trachea: No tracheal deviation. Cardiovascular:      Rate and Rhythm: Normal rate and regular rhythm. Heart sounds: Normal heart sounds. No murmur. No friction rub. Pulmonary:      Effort: Pulmonary effort is normal. No respiratory distress. Breath sounds: Normal breath sounds. No wheezing. Chest:      Chest wall: No tenderness. Abdominal:      General: Bowel sounds are normal. There is no distension. Palpations: Abdomen is soft. Tenderness: There is no abdominal tenderness. There is no guarding. Musculoskeletal:      Comments: Exfix in place on left ankle with 3 pins. Patient has pain and tenderness on the most inferior pins. Skin:     General: Skin is warm and dry. Capillary Refill: Capillary refill takes less than 2 seconds. Coloration: Skin is not pale. Findings: No erythema. Neurological:      General: No focal deficit present. Mental Status: She is alert and oriented to person, place, and time. Mental status is at baseline. Cranial Nerves: No cranial nerve deficit. Sensory: No sensory deficit. Motor: No weakness.    Psychiatric:         Mood and Affect: Mood normal.         Behavior: Behavior normal.         DIFFERENTIAL  DIAGNOSIS     PLAN (LABS / IMAGING / EKG):  Orders Placed This Encounter   Procedures    XR ANKLE LEFT (MIN 3 VIEWS)    Inpatient consult to Podiatry       MEDICATIONS ORDERED:  Orders Placed This Encounter   Medications    morphine injection 4 mg    ketorolac (TORADOL) injection 30 mg    oxyCODONE-acetaminophen (PERCOCET)  MG per tablet     Sig: Take 1 tablet by mouth every 8 hours as needed for Pain (Take every 4-6 hours as needed for post-op pain) for up to 3 days. Intended supply: 30 days     Dispense:  6 tablet     Refill:  0     Reduce doses taken as pain becomes manageable       DDX: Postop problem, surgical site infection, hardware displacement    Initial MDM/Plan: 41 y.o. female who presents with left ankle pain. Ex-fix in place. Will try intramuscular medications to obtain pain control. Will discuss with podiatry regarding the recommendations. No new injury, sensation intact. DIAGNOSTIC RESULTS / EMERGENCYDEPARTMENT COURSE / MDM     LABS:  Labs Reviewed - No data to display      RADIOLOGY:  Xr Ankle Left (min 3 Views)    Result Date: 2/16/2021  EXAMINATION: THREE X-RAY VIEWS OF THE LEFT ANKLE 2/16/2021 11:38 am COMPARISON: February 8, 2021 HISTORY: ORDERING SYSTEM PROVIDED HISTORY: Ex fix, increased pain TECHNOLOGIST PROVIDED HISTORY: Ex fix, increased pain Reason for Exam: Ex fix, increased pain Acuity: Acute Type of Exam: Initial FINDINGS: Postoperative external fixation hardware placement. Widening of the ankle mortise medially consistent with ligamentous injury. Fracture of the distal fibula with distraction and displacement of fracture fragments. Fracture of the posterior malleolus with mild displacement. Mild soft tissue swelling. External fixation hardware placement. Widening of the ankle mortise medially from ligamentous injury. Fracture of the distal fibula with distraction and displacement of fracture fragments.          EKG      All EKG's are interpreted by the Emergency Department Physicianwho either signs or Co-signs this chart in the absence of a cardiologist.    EMERGENCY DEPARTMENT COURSE:  ED Course as of Feb 16 1257   Tue Feb 16, 2021   1122 Spoke with podiatry, recommended XR and if pin sites look okay she can be discharged with follow up. [JG]   1202 Podiatry to evaluate. Dressing taken down with some discharge surrounding pin sites. [JG]      ED Course User Index  [JG] Colvin Mohs, DO          PROCEDURES:  None    CONSULTS:  IP CONSULT TO PODIATRY    CRITICAL CARE:  Please see attending note    FINAL IMPRESSION      1. Acute left ankle pain    2.  Post-op pain          DISPOSITION / PLAN     DISPOSITION Decision To Discharge 02/16/2021 12:41:19 PM      PATIENT REFERRED TO:  Jillian Kapadia PA-C  [address] [facility] [address]    Go in 3 days      [facility] ED  [address] [facility] [id number] [phone]  Go to   If symptoms worsen      DISCHARGE MEDICATIONS:  Current Discharge Medication List          Colvin Mohs, DO  Emergency Medicine Resident    (Please note that portions of this note were completed with a voice recognition program.Efforts were made to edit the dictations but occasionally words are mis-transcribed.)     Colvin Mohs, DO  Resident  02/16/21 1257

## 2021-02-16 NOTE — ED NOTES
Pt to ED from home via EMS with c/o left ankle pain x2 days persisting since dressing change on ankle. States pain feels like \"It is cold inside\" where pins are placed. Pt had surgery 2/08/2021 on left ankle at [address] following fall. Ankle currently in external fixator. Pt alert and oriented x4 in no signs of acute distress, tearful due to pain. Pt states no injury to ankle since fall, has \"bumped it\" prior to dressing change but not since.      Brandon Wright RN  02/16/21 3707

## 2021-02-17 ENCOUNTER — TELEPHONE (OUTPATIENT)
Dept: PRIMARY CARE CLINIC | Age: 41
End: 2021-02-17

## 2021-02-18 ENCOUNTER — ANESTHESIA EVENT (OUTPATIENT)
Dept: OPERATING ROOM | Age: 41
End: 2021-02-18
Payer: COMMERCIAL

## 2021-02-19 ENCOUNTER — ANESTHESIA (OUTPATIENT)
Dept: OPERATING ROOM | Age: 41
End: 2021-02-19
Payer: COMMERCIAL

## 2021-02-19 ENCOUNTER — APPOINTMENT (OUTPATIENT)
Dept: GENERAL RADIOLOGY | Age: 41
End: 2021-02-19
Attending: PODIATRIST
Payer: COMMERCIAL

## 2021-02-19 ENCOUNTER — HOSPITAL ENCOUNTER (OUTPATIENT)
Age: 41
Setting detail: OUTPATIENT SURGERY
Discharge: HOME | End: 2021-02-19
Attending: PODIATRIST | Admitting: PODIATRIST
Payer: COMMERCIAL

## 2021-02-19 VITALS — OXYGEN SATURATION: 100 % | DIASTOLIC BLOOD PRESSURE: 103 MMHG | SYSTOLIC BLOOD PRESSURE: 130 MMHG | TEMPERATURE: 93.6 F

## 2021-02-19 VITALS
WEIGHT: 213 LBS | RESPIRATION RATE: 18 BRPM | DIASTOLIC BLOOD PRESSURE: 80 MMHG | BODY MASS INDEX: 30.49 KG/M2 | TEMPERATURE: 97.7 F | OXYGEN SATURATION: 100 % | HEIGHT: 70 IN | SYSTOLIC BLOOD PRESSURE: 132 MMHG | HEART RATE: 80 BPM

## 2021-02-19 DIAGNOSIS — G89.18 ACUTE POST-OPERATIVE PAIN: Primary | ICD-10-CM

## 2021-02-19 LAB
-: NORMAL
HCG, PREGNANCY URINE (POC): NEGATIVE

## 2021-02-19 PROCEDURE — 2580000003 HC RX 258: Performed by: ANESTHESIOLOGY

## 2021-02-19 PROCEDURE — 6360000002 HC RX W HCPCS: Performed by: ANESTHESIOLOGY

## 2021-02-19 PROCEDURE — 3209999900 FLUORO FOR SURGICAL PROCEDURES

## 2021-02-19 PROCEDURE — 7100000000 HC PACU RECOVERY - FIRST 15 MIN: Performed by: PODIATRIST

## 2021-02-19 PROCEDURE — 3700000000 HC ANESTHESIA ATTENDED CARE: Performed by: PODIATRIST

## 2021-02-19 PROCEDURE — 64447 NJX AA&/STRD FEMORAL NRV IMG: CPT | Performed by: ANESTHESIOLOGY

## 2021-02-19 PROCEDURE — 6360000002 HC RX W HCPCS

## 2021-02-19 PROCEDURE — 7100000011 HC PHASE II RECOVERY - ADDTL 15 MIN: Performed by: PODIATRIST

## 2021-02-19 PROCEDURE — 2720000010 HC SURG SUPPLY STERILE: Performed by: PODIATRIST

## 2021-02-19 PROCEDURE — 73610 X-RAY EXAM OF ANKLE: CPT

## 2021-02-19 PROCEDURE — 6360000002 HC RX W HCPCS: Performed by: STUDENT IN AN ORGANIZED HEALTH CARE EDUCATION/TRAINING PROGRAM

## 2021-02-19 PROCEDURE — 3600000003 HC SURGERY LEVEL 3 BASE: Performed by: PODIATRIST

## 2021-02-19 PROCEDURE — 2709999900 HC NON-CHARGEABLE SUPPLY: Performed by: PODIATRIST

## 2021-02-19 PROCEDURE — 27814 TREATMENT OF ANKLE FRACTURE: CPT | Performed by: PODIATRIST

## 2021-02-19 PROCEDURE — 2500000003 HC RX 250 WO HCPCS: Performed by: ANESTHESIOLOGY

## 2021-02-19 PROCEDURE — 6370000000 HC RX 637 (ALT 250 FOR IP): Performed by: PODIATRIST

## 2021-02-19 PROCEDURE — 7100000001 HC PACU RECOVERY - ADDTL 15 MIN: Performed by: PODIATRIST

## 2021-02-19 PROCEDURE — 81025 URINE PREGNANCY TEST: CPT

## 2021-02-19 PROCEDURE — C1713 ANCHOR/SCREW BN/BN,TIS/BN: HCPCS | Performed by: PODIATRIST

## 2021-02-19 PROCEDURE — 7100000010 HC PHASE II RECOVERY - FIRST 15 MIN: Performed by: PODIATRIST

## 2021-02-19 PROCEDURE — 88300 SURGICAL PATH GROSS: CPT

## 2021-02-19 PROCEDURE — 3600000013 HC SURGERY LEVEL 3 ADDTL 15MIN: Performed by: PODIATRIST

## 2021-02-19 PROCEDURE — 2500000003 HC RX 250 WO HCPCS

## 2021-02-19 PROCEDURE — 3700000001 HC ADD 15 MINUTES (ANESTHESIA): Performed by: PODIATRIST

## 2021-02-19 PROCEDURE — 6370000000 HC RX 637 (ALT 250 FOR IP): Performed by: ANESTHESIOLOGY

## 2021-02-19 PROCEDURE — 7100000031 HC ASPR PHASE II RECOVERY - ADDTL 15 MIN: Performed by: PODIATRIST

## 2021-02-19 PROCEDURE — 7100000030 HC ASPR PHASE II RECOVERY - FIRST 15 MIN: Performed by: PODIATRIST

## 2021-02-19 PROCEDURE — C9290 INJ, BUPIVACAINE LIPOSOME: HCPCS | Performed by: ANESTHESIOLOGY

## 2021-02-19 PROCEDURE — 64445 NJX AA&/STRD SCIATIC NRV IMG: CPT | Performed by: ANESTHESIOLOGY

## 2021-02-19 DEVICE — HEADED SCREW
Type: IMPLANTABLE DEVICE | Site: ANKLE | Status: FUNCTIONAL
Brand: MINI

## 2021-02-19 DEVICE — LOCKING SCREW
Type: IMPLANTABLE DEVICE | Site: ANKLE | Status: FUNCTIONAL
Brand: VARIAX

## 2021-02-19 DEVICE — BONE SCREW
Type: IMPLANTABLE DEVICE | Site: ANKLE | Status: FUNCTIONAL
Brand: VARIAX

## 2021-02-19 DEVICE — BROAD Y-PLATE
Type: IMPLANTABLE DEVICE | Site: ANKLE | Status: FUNCTIONAL
Brand: VARIAX

## 2021-02-19 DEVICE — ONE-THIRD TUBULAR PLATE: Type: IMPLANTABLE DEVICE | Site: ANKLE | Status: FUNCTIONAL

## 2021-02-19 DEVICE — BONE SCREW, T6
Type: IMPLANTABLE DEVICE | Site: ANKLE | Status: FUNCTIONAL
Brand: VARIAX

## 2021-02-19 RX ORDER — FENTANYL CITRATE 50 UG/ML
25 INJECTION, SOLUTION INTRAMUSCULAR; INTRAVENOUS EVERY 5 MIN PRN
Status: DISCONTINUED | OUTPATIENT
Start: 2021-02-19 | End: 2021-02-19 | Stop reason: HOSPADM

## 2021-02-19 RX ORDER — ONDANSETRON 2 MG/ML
INJECTION INTRAMUSCULAR; INTRAVENOUS PRN
Status: DISCONTINUED | OUTPATIENT
Start: 2021-02-19 | End: 2021-02-19 | Stop reason: SDUPTHER

## 2021-02-19 RX ORDER — DOXYCYCLINE HYCLATE 100 MG
100 TABLET ORAL DAILY
Qty: 10 TABLET | Refills: 0 | Status: SHIPPED | OUTPATIENT
Start: 2021-02-19 | End: 2021-03-01

## 2021-02-19 RX ORDER — KETOROLAC TROMETHAMINE 30 MG/ML
30 INJECTION, SOLUTION INTRAMUSCULAR; INTRAVENOUS ONCE
Status: COMPLETED | OUTPATIENT
Start: 2021-02-19 | End: 2021-02-19

## 2021-02-19 RX ORDER — HYDROCODONE BITARTRATE AND ACETAMINOPHEN 5; 325 MG/1; MG/1
2 TABLET ORAL PRN
Status: COMPLETED | OUTPATIENT
Start: 2021-02-19 | End: 2021-02-19

## 2021-02-19 RX ORDER — ONDANSETRON 2 MG/ML
4 INJECTION INTRAMUSCULAR; INTRAVENOUS
Status: DISCONTINUED | OUTPATIENT
Start: 2021-02-19 | End: 2021-02-19 | Stop reason: HOSPADM

## 2021-02-19 RX ORDER — LIDOCAINE HYDROCHLORIDE 10 MG/ML
1 INJECTION, SOLUTION EPIDURAL; INFILTRATION; INTRACAUDAL; PERINEURAL
Status: DISCONTINUED | OUTPATIENT
Start: 2021-02-19 | End: 2021-02-19 | Stop reason: HOSPADM

## 2021-02-19 RX ORDER — NEOSTIGMINE METHYLSULFATE 5 MG/5 ML
SYRINGE (ML) INTRAVENOUS PRN
Status: DISCONTINUED | OUTPATIENT
Start: 2021-02-19 | End: 2021-02-19 | Stop reason: SDUPTHER

## 2021-02-19 RX ORDER — SODIUM CHLORIDE 0.9 % (FLUSH) 0.9 %
10 SYRINGE (ML) INJECTION PRN
Status: DISCONTINUED | OUTPATIENT
Start: 2021-02-19 | End: 2021-02-19 | Stop reason: HOSPADM

## 2021-02-19 RX ORDER — HYDROMORPHONE HCL 110MG/55ML
PATIENT CONTROLLED ANALGESIA SYRINGE INTRAVENOUS PRN
Status: DISCONTINUED | OUTPATIENT
Start: 2021-02-19 | End: 2021-02-19 | Stop reason: SDUPTHER

## 2021-02-19 RX ORDER — FENTANYL CITRATE 50 UG/ML
INJECTION, SOLUTION INTRAMUSCULAR; INTRAVENOUS PRN
Status: DISCONTINUED | OUTPATIENT
Start: 2021-02-19 | End: 2021-02-19 | Stop reason: SDUPTHER

## 2021-02-19 RX ORDER — HYDRALAZINE HYDROCHLORIDE 20 MG/ML
5 INJECTION INTRAMUSCULAR; INTRAVENOUS EVERY 10 MIN PRN
Status: DISCONTINUED | OUTPATIENT
Start: 2021-02-19 | End: 2021-02-19 | Stop reason: HOSPADM

## 2021-02-19 RX ORDER — DEXAMETHASONE SODIUM PHOSPHATE 4 MG/ML
INJECTION, SOLUTION INTRA-ARTICULAR; INTRALESIONAL; INTRAMUSCULAR; INTRAVENOUS; SOFT TISSUE PRN
Status: DISCONTINUED | OUTPATIENT
Start: 2021-02-19 | End: 2021-02-19 | Stop reason: SDUPTHER

## 2021-02-19 RX ORDER — SODIUM CHLORIDE 0.9 % (FLUSH) 0.9 %
10 SYRINGE (ML) INJECTION EVERY 12 HOURS SCHEDULED
Status: DISCONTINUED | OUTPATIENT
Start: 2021-02-19 | End: 2021-02-19 | Stop reason: HOSPADM

## 2021-02-19 RX ORDER — METOCLOPRAMIDE HYDROCHLORIDE 5 MG/ML
10 INJECTION INTRAMUSCULAR; INTRAVENOUS
Status: DISCONTINUED | OUTPATIENT
Start: 2021-02-19 | End: 2021-02-19 | Stop reason: HOSPADM

## 2021-02-19 RX ORDER — MIDAZOLAM HYDROCHLORIDE 1 MG/ML
INJECTION INTRAMUSCULAR; INTRAVENOUS PRN
Status: DISCONTINUED | OUTPATIENT
Start: 2021-02-19 | End: 2021-02-19 | Stop reason: SDUPTHER

## 2021-02-19 RX ORDER — HYDROCODONE BITARTRATE AND ACETAMINOPHEN 5; 325 MG/1; MG/1
1 TABLET ORAL PRN
Status: COMPLETED | OUTPATIENT
Start: 2021-02-19 | End: 2021-02-19

## 2021-02-19 RX ORDER — ROCURONIUM BROMIDE 10 MG/ML
INJECTION, SOLUTION INTRAVENOUS PRN
Status: DISCONTINUED | OUTPATIENT
Start: 2021-02-19 | End: 2021-02-19 | Stop reason: SDUPTHER

## 2021-02-19 RX ORDER — SODIUM CHLORIDE, SODIUM LACTATE, POTASSIUM CHLORIDE, CALCIUM CHLORIDE 600; 310; 30; 20 MG/100ML; MG/100ML; MG/100ML; MG/100ML
INJECTION, SOLUTION INTRAVENOUS CONTINUOUS
Status: DISCONTINUED | OUTPATIENT
Start: 2021-02-19 | End: 2021-02-19 | Stop reason: HOSPADM

## 2021-02-19 RX ORDER — MORPHINE SULFATE 2 MG/ML
2 INJECTION, SOLUTION INTRAMUSCULAR; INTRAVENOUS EVERY 5 MIN PRN
Status: DISCONTINUED | OUTPATIENT
Start: 2021-02-19 | End: 2021-02-19 | Stop reason: HOSPADM

## 2021-02-19 RX ORDER — OXYCODONE HYDROCHLORIDE AND ACETAMINOPHEN 5; 325 MG/1; MG/1
1 TABLET ORAL EVERY 6 HOURS PRN
Qty: 28 TABLET | Refills: 0 | Status: SHIPPED | OUTPATIENT
Start: 2021-02-19 | End: 2021-02-26

## 2021-02-19 RX ORDER — LABETALOL HYDROCHLORIDE 5 MG/ML
5 INJECTION, SOLUTION INTRAVENOUS EVERY 10 MIN PRN
Status: DISCONTINUED | OUTPATIENT
Start: 2021-02-19 | End: 2021-02-19 | Stop reason: HOSPADM

## 2021-02-19 RX ORDER — GLYCOPYRROLATE 1 MG/5 ML
SYRINGE (ML) INTRAVENOUS PRN
Status: DISCONTINUED | OUTPATIENT
Start: 2021-02-19 | End: 2021-02-19 | Stop reason: SDUPTHER

## 2021-02-19 RX ORDER — LIDOCAINE HYDROCHLORIDE 10 MG/ML
INJECTION, SOLUTION EPIDURAL; INFILTRATION; INTRACAUDAL; PERINEURAL PRN
Status: DISCONTINUED | OUTPATIENT
Start: 2021-02-19 | End: 2021-02-19 | Stop reason: SDUPTHER

## 2021-02-19 RX ORDER — DIPHENHYDRAMINE HYDROCHLORIDE 50 MG/ML
12.5 INJECTION INTRAMUSCULAR; INTRAVENOUS
Status: DISCONTINUED | OUTPATIENT
Start: 2021-02-19 | End: 2021-02-19 | Stop reason: HOSPADM

## 2021-02-19 RX ORDER — PROPOFOL 10 MG/ML
INJECTION, EMULSION INTRAVENOUS PRN
Status: DISCONTINUED | OUTPATIENT
Start: 2021-02-19 | End: 2021-02-19 | Stop reason: SDUPTHER

## 2021-02-19 RX ORDER — MEPERIDINE HYDROCHLORIDE 25 MG/ML
12.5 INJECTION INTRAMUSCULAR; INTRAVENOUS; SUBCUTANEOUS EVERY 5 MIN PRN
Status: DISCONTINUED | OUTPATIENT
Start: 2021-02-19 | End: 2021-02-19 | Stop reason: HOSPADM

## 2021-02-19 RX ORDER — BUPIVACAINE HYDROCHLORIDE 5 MG/ML
INJECTION, SOLUTION EPIDURAL; INTRACAUDAL
Status: COMPLETED | OUTPATIENT
Start: 2021-02-19 | End: 2021-02-19

## 2021-02-19 RX ORDER — FENTANYL CITRATE 50 UG/ML
50 INJECTION, SOLUTION INTRAMUSCULAR; INTRAVENOUS EVERY 5 MIN PRN
Status: DISCONTINUED | OUTPATIENT
Start: 2021-02-19 | End: 2021-02-19 | Stop reason: HOSPADM

## 2021-02-19 RX ORDER — GINSENG 100 MG
CAPSULE ORAL PRN
Status: DISCONTINUED | OUTPATIENT
Start: 2021-02-19 | End: 2021-02-19 | Stop reason: ALTCHOICE

## 2021-02-19 RX ADMIN — ROCURONIUM BROMIDE 50 MG: 10 INJECTION, SOLUTION INTRAVENOUS at 11:55

## 2021-02-19 RX ADMIN — BUPIVACAINE HYDROCHLORIDE 15 ML: 5 INJECTION, SOLUTION EPIDURAL; INTRACAUDAL at 12:32

## 2021-02-19 RX ADMIN — BUPIVACAINE HYDROCHLORIDE 10 ML: 5 INJECTION, SOLUTION EPIDURAL; INTRACAUDAL at 12:31

## 2021-02-19 RX ADMIN — HYDROMORPHONE HYDROCHLORIDE 0.4 MG: 2 INJECTION, SOLUTION INTRAMUSCULAR; INTRAVENOUS; SUBCUTANEOUS at 12:43

## 2021-02-19 RX ADMIN — PROPOFOL 200 MG: 10 INJECTION, EMULSION INTRAVENOUS at 11:55

## 2021-02-19 RX ADMIN — HYDROMORPHONE HYDROCHLORIDE 0.5 MG: 1 INJECTION, SOLUTION INTRAMUSCULAR; INTRAVENOUS; SUBCUTANEOUS at 16:19

## 2021-02-19 RX ADMIN — MIDAZOLAM 2 MG: 1 INJECTION INTRAMUSCULAR; INTRAVENOUS at 11:56

## 2021-02-19 RX ADMIN — FENTANYL CITRATE 50 MCG: 50 INJECTION, SOLUTION INTRAMUSCULAR; INTRAVENOUS at 11:55

## 2021-02-19 RX ADMIN — ONDANSETRON 4 MG: 2 INJECTION INTRAMUSCULAR; INTRAVENOUS at 12:44

## 2021-02-19 RX ADMIN — BUPIVACAINE 5 ML: 13.3 INJECTION, SUSPENSION, LIPOSOMAL INFILTRATION at 12:31

## 2021-02-19 RX ADMIN — BUPIVACAINE 5 ML: 13.3 INJECTION, SUSPENSION, LIPOSOMAL INFILTRATION at 12:32

## 2021-02-19 RX ADMIN — LIDOCAINE HYDROCHLORIDE 50 MG: 10 INJECTION, SOLUTION EPIDURAL; INFILTRATION; INTRACAUDAL; PERINEURAL at 11:55

## 2021-02-19 RX ADMIN — PROPOFOL 90 MG: 10 INJECTION, EMULSION INTRAVENOUS at 15:54

## 2021-02-19 RX ADMIN — HYDROMORPHONE HYDROCHLORIDE 0.6 MG: 2 INJECTION, SOLUTION INTRAMUSCULAR; INTRAVENOUS; SUBCUTANEOUS at 13:04

## 2021-02-19 RX ADMIN — MIDAZOLAM 2 MG: 1 INJECTION INTRAMUSCULAR; INTRAVENOUS at 11:49

## 2021-02-19 RX ADMIN — Medication 2 MG: at 15:08

## 2021-02-19 RX ADMIN — HYDROMORPHONE HYDROCHLORIDE 0.5 MG: 1 INJECTION, SOLUTION INTRAMUSCULAR; INTRAVENOUS; SUBCUTANEOUS at 16:38

## 2021-02-19 RX ADMIN — HYDROMORPHONE HYDROCHLORIDE 0.2 MG: 2 INJECTION, SOLUTION INTRAMUSCULAR; INTRAVENOUS; SUBCUTANEOUS at 15:48

## 2021-02-19 RX ADMIN — FENTANYL CITRATE 50 MCG: 50 INJECTION, SOLUTION INTRAMUSCULAR; INTRAVENOUS at 12:41

## 2021-02-19 RX ADMIN — HYDROMORPHONE HYDROCHLORIDE 0.2 MG: 2 INJECTION, SOLUTION INTRAMUSCULAR; INTRAVENOUS; SUBCUTANEOUS at 15:08

## 2021-02-19 RX ADMIN — CEFAZOLIN 2 G: 10 INJECTION, POWDER, FOR SOLUTION INTRAVENOUS at 12:04

## 2021-02-19 RX ADMIN — HYDROCODONE BITARTRATE AND ACETAMINOPHEN 2 TABLET: 5; 325 TABLET ORAL at 17:29

## 2021-02-19 RX ADMIN — KETOROLAC TROMETHAMINE 30 MG: 30 INJECTION, SOLUTION INTRAMUSCULAR; INTRAVENOUS at 16:24

## 2021-02-19 RX ADMIN — DEXAMETHASONE SODIUM PHOSPHATE 4 MG: 4 INJECTION, SOLUTION INTRAMUSCULAR; INTRAVENOUS at 12:44

## 2021-02-19 RX ADMIN — SODIUM CHLORIDE, POTASSIUM CHLORIDE, SODIUM LACTATE AND CALCIUM CHLORIDE: 600; 310; 30; 20 INJECTION, SOLUTION INTRAVENOUS at 16:05

## 2021-02-19 RX ADMIN — HYDROMORPHONE HYDROCHLORIDE 0.6 MG: 2 INJECTION, SOLUTION INTRAMUSCULAR; INTRAVENOUS; SUBCUTANEOUS at 12:50

## 2021-02-19 RX ADMIN — Medication 0.4 MG: at 15:08

## 2021-02-19 RX ADMIN — SODIUM CHLORIDE, POTASSIUM CHLORIDE, SODIUM LACTATE AND CALCIUM CHLORIDE: 600; 310; 30; 20 INJECTION, SOLUTION INTRAVENOUS at 12:13

## 2021-02-19 RX ADMIN — SODIUM CHLORIDE, POTASSIUM CHLORIDE, SODIUM LACTATE AND CALCIUM CHLORIDE: 600; 310; 30; 20 INJECTION, SOLUTION INTRAVENOUS at 11:14

## 2021-02-19 ASSESSMENT — PULMONARY FUNCTION TESTS
PIF_VALUE: 19
PIF_VALUE: 1
PIF_VALUE: 17
PIF_VALUE: 25
PIF_VALUE: 16
PIF_VALUE: 17
PIF_VALUE: 15
PIF_VALUE: 16
PIF_VALUE: 16
PIF_VALUE: 19
PIF_VALUE: 17
PIF_VALUE: 16
PIF_VALUE: 17
PIF_VALUE: 16
PIF_VALUE: 1
PIF_VALUE: 16
PIF_VALUE: 16
PIF_VALUE: 19
PIF_VALUE: 16
PIF_VALUE: 19
PIF_VALUE: 16
PIF_VALUE: 20
PIF_VALUE: 18
PIF_VALUE: 17
PIF_VALUE: 18
PIF_VALUE: 17
PIF_VALUE: 16
PIF_VALUE: 17
PIF_VALUE: 16
PIF_VALUE: 17
PIF_VALUE: 16
PIF_VALUE: 18
PIF_VALUE: 16
PIF_VALUE: 17
PIF_VALUE: 16
PIF_VALUE: 16
PIF_VALUE: 17
PIF_VALUE: 16
PIF_VALUE: 15
PIF_VALUE: 3
PIF_VALUE: 17
PIF_VALUE: 16
PIF_VALUE: 17
PIF_VALUE: 16
PIF_VALUE: 1
PIF_VALUE: 19
PIF_VALUE: 17
PIF_VALUE: 19
PIF_VALUE: 16
PIF_VALUE: 17
PIF_VALUE: 16
PIF_VALUE: 0
PIF_VALUE: 16
PIF_VALUE: 17
PIF_VALUE: 15
PIF_VALUE: 16
PIF_VALUE: 18
PIF_VALUE: 17
PIF_VALUE: 16
PIF_VALUE: 16
PIF_VALUE: 17
PIF_VALUE: 4
PIF_VALUE: 18
PIF_VALUE: 19
PIF_VALUE: 17
PIF_VALUE: 27
PIF_VALUE: 16
PIF_VALUE: 16
PIF_VALUE: 17
PIF_VALUE: 17
PIF_VALUE: 16
PIF_VALUE: 17
PIF_VALUE: 17
PIF_VALUE: 16
PIF_VALUE: 18
PIF_VALUE: 15
PIF_VALUE: 16
PIF_VALUE: 16
PIF_VALUE: 18
PIF_VALUE: 2
PIF_VALUE: 17
PIF_VALUE: 16
PIF_VALUE: 17
PIF_VALUE: 15
PIF_VALUE: 16
PIF_VALUE: 35
PIF_VALUE: 16
PIF_VALUE: 15
PIF_VALUE: 16
PIF_VALUE: 4
PIF_VALUE: 19
PIF_VALUE: 17
PIF_VALUE: 20
PIF_VALUE: 16
PIF_VALUE: 18
PIF_VALUE: 15
PIF_VALUE: 15
PIF_VALUE: 18
PIF_VALUE: 16
PIF_VALUE: 6
PIF_VALUE: 20
PIF_VALUE: 16
PIF_VALUE: 16
PIF_VALUE: 17
PIF_VALUE: 15
PIF_VALUE: 17
PIF_VALUE: 16
PIF_VALUE: 15
PIF_VALUE: 16
PIF_VALUE: 2
PIF_VALUE: 16

## 2021-02-19 ASSESSMENT — PAIN DESCRIPTION - ORIENTATION: ORIENTATION: LEFT

## 2021-02-19 ASSESSMENT — PAIN DESCRIPTION - FREQUENCY: FREQUENCY: CONTINUOUS

## 2021-02-19 ASSESSMENT — PAIN DESCRIPTION - DESCRIPTORS
DESCRIPTORS: STABBING;THROBBING
DESCRIPTORS: STABBING;THROBBING

## 2021-02-19 ASSESSMENT — LIFESTYLE VARIABLES: SMOKING_STATUS: 1

## 2021-02-19 ASSESSMENT — PAIN DESCRIPTION - PAIN TYPE
TYPE: SURGICAL PAIN
TYPE: SURGICAL PAIN

## 2021-02-19 ASSESSMENT — PAIN DESCRIPTION - ONSET: ONSET: GRADUAL

## 2021-02-19 ASSESSMENT — ENCOUNTER SYMPTOMS: STRIDOR: 0

## 2021-02-19 ASSESSMENT — PAIN - FUNCTIONAL ASSESSMENT: PAIN_FUNCTIONAL_ASSESSMENT: PREVENTS OR INTERFERES SOME ACTIVE ACTIVITIES AND ADLS

## 2021-02-19 ASSESSMENT — PAIN SCALES - GENERAL
PAINLEVEL_OUTOF10: 7
PAINLEVEL_OUTOF10: 7

## 2021-02-19 NOTE — ANESTHESIA POSTPROCEDURE EVALUATION
Department of Anesthesiology  Postprocedure Note    Patient: Gena Cancino  MRN: [id number]  YOB: 1980  Date of evaluation: 2/19/2021  Time:  5:37 PM     Procedure Summary     Date: 02/19/21 Room / Location: [address] [place] [address] / [email or web address]    Anesthesia Start: 8761 Anesthesia Stop: 2567    Procedure: ANKLE OPEN REDUCTION INTERNAL FIXATION (Left Ankle) Diagnosis:       (LEFT ANKLE FRACTURE)      (*USE ZULEYMA [place])    Surgeons: Felix Najera DPM Responsible Provider: Zakiya Mortensen MD    Anesthesia Type: general ASA Status: 2          Anesthesia Type: general    Austyn Phase I: Austyn Score: 10    Austyn Phase II:      Last vitals: Reviewed and per EMR flowsheets.        Anesthesia Post Evaluation    Patient location during evaluation: bedside  Patient participation: complete - patient participated  Level of consciousness: awake and alert  Airway patency: patent  Nausea & Vomiting: no nausea and no vomiting  Complications: no  Cardiovascular status: hemodynamically stable  Respiratory status: acceptable  Hydration status: stable

## 2021-02-19 NOTE — OP NOTE
OP NOTE    PATIENT NAME: Ivanna Falk  YOB: 1980  -  41 y.o. female  MRN: [id number]  DATE: 2/19/2021  BILLING #: [id number]    Surgeon(s):  Dewey Canela DPM     ASSISTANTS: Pankaj Sorensen DPM     PRE-OP DIAGNOSIS:   1. Closed bimalleolar ankle fracture/dislocation, left ankle  2. Injury syndesmotic ligaments, left ankle  3. Status post application of multiplanar external fixator, left ankle (DOS:02/08/21)  POST-OP DIAGNOSIS: Same as above. PROCEDURE:   1. Open reduction internal fixation of bimalleolar ankle fracture, left ankle  2. Open reduction internal fixation of syndesmosis,left ankle   3. Removal of multiplanar external fixator, left ankle    ANESTHESIA: General, popliteal and saphenous block by anesthesia team.    HEMOSTASIS: Pneumatic thigh tourniquet @ 300 mmHg for 120 minutes. ESTIMATED BLOOD LOSS: Roughly 50cc. MATERIALS:   Implant Name Type Inv. Item Serial No.  Lot No. LRB No. Used Action   PLATE BNE [id number] 10 H 1 3RD TBLR VARIAX 2  PLATE BNE [id number] 10 H 1 3RD TBLR VARIAX 2  [employer] [facility]  Left 1 Implanted   PLATE BNE BROAD Y09 Y-PLT 5 H  PLATE BNE BROAD S04 Y-PLT 5 H  [facility]  Left 1 Im[facility]ed   K WIRE FIX DIA1. 15MM NTHRD MINI  K WIRE FIX DIA1. 15MM NTHRD MINI  [facility]  Left 4 Implanted and Explanted   SCREW COMPRSS 16 MM 3 MM  SCREW COMPRSS 16 MM 3 MM  [employer] [facility]  Left 2 Implanted   SCREW BNE T6 DRV 2X16 MM VARIAX  SCREW BNE T6 DRV 2X16 MM VARIAX  [employer] [facility]  Left 1 Implanted   SCREW BNE L18MM DIA3. 5MM TI ALLY NONLOCKING FULL THRD T10  SCREW BNE L18MM DIA3. 5MM TI ALLY NONLOCKING FULL THRD T10  [employer] [facility]  Left 1 Implanted   SCREW BNE L28MM DIA3.5MM ZULEIKA TI NONLOCKING FULL THRD FOR  SCREW BNE L28MM DIA3.5MM ZULEIKA TI NONLOCKING FULL THRD FOR  [employer] [facility]  Left 1 Implanted   SCREW BNE L40MM DIA3. 5MM VARIAX  SCREW BNE L40MM DIA3. 5MM VARIAX  [employer] [facility]-  Left 1 Implanted   SCREW BNE L44MM DIA3. 5MM TI ALLY NONLOCKING FULL THRD T10  SCREW BNE L44MM DIA3. 5MM TI ALLY NONLOCKING FULL THRD T10  [employer] [facility]  Left 1 Wasted   SCREW BNE L12MM DIA3.5MM CANC TI AMY FULL THRD T10 DRV FOR  SCREW BNE L12MM DIA3.5MM CANC TI AMY FULL THRD T10 DRV FOR  [employer] [facility]-  Left 3 Wasted   SCREW BNE L14MM DIA3.5MM CANC TI AMY FULL THRD T10 DRV FOR  SCREW BNE L14MM DIA3.5MM CANC TI AMY FULL THRD T10 DRV FOR  [employer] [facility]  Left 1 Implanted   SCREW BNE L16MM DIA3.5MM ZULEIKA TI AMY FULL THRD T10 DRV FOR  SCREW BNE L16MM DIA3.5MM ZULEIKA TI AMY FULL THRD T10 DRV FOR  [employer] [facility]  Left 4 Implanted   SCREW BNE L18MM DIA3.5MM ZULEIKA TI AMY FULL THRD T10 DRV FOR  SCREW BNE L18MM DIA3.5MM ZULEIKA TI AMY FULL THRD T10 DRV FOR  [employer] [facility]  Left 1 Implanted   SCREW BNE L22MM DIA3. 5MM TI ALLY AMY FULL THRD T10 DRV  SCREW BNE L22MM DIA3. 5MM TI ALLY AMY FULL THRD T10 DRV  [employer] [facility]  Left 1 Implanted   SCREW BNE L26MM DIA3.5MM CANC TI AMY FULL THRD T10 DRV FOR  SCREW BNE L26MM DIA3.5MM CANC TI AMY FULL THRD T10 DRV FOR  [employer] [facility]  Left 1 Implanted   SCREW BNE AD PED L34MM DIA3.5MM CANC TI NONCANNULATED AMY  SCREW BNE AD PED L34MM DIA3.5MM CANC TI NONCANNULATED AMY  [employer] [facility]  Left 1 Implanted   SCREW BNE AD PED L40MM DIA3.5MM CANC TI NONCANNULATED AMY  SCREW BNE AD PED L40MM DIA3.5MM CANC TI NONCANNULATED AMY  [employer] [facility]  Left 1 Implanted   SCREW BNE L42MM OD3.5MM NONSTERILE AMY FULL THRD T10 DRV  SCREW BNE L42MM OD3.5MM NONSTERILE AMY FULL THRD T10 DRV  [employer] [facility]  Left 1 Implanted       INJECTABLES: None    SPECIMEN:   ID Type Source Tests Collected by Time Destination   A : External Fixator Removed from Left Ankle Hardware Hardware SURGICAL PATHOLOGY Griffin Looney DPM 2/19/2021 9911        COMPLICATIONS: None    FINDINGS: Consistent with above diagnosis. Posterior inferior tibiofibular ligament intact. Anatomic alignment of ankle joint restored. Hardware placed without complication. Please see op note for full detail. INDICATIONS FOR PROCEDURE:  The patient is a 67-year-old female who sustained a closed bimalleolar ankle fracture dislocation of the left ankle on 02/08/2021 due to a mechanical fall from standing height. The patient underwent subsequent emergent application of multiplanar external fixator to the left ankle on 02/08/2021 due to declan instability of injury, severe soft tissue swelling, concerns for neurovascular compromise due to persistent dislocation. The external fixator application was stage I of our planned treatment course. Preoperative CT of the left ankle demonstrated a comminuted spiral fracture of the distal fibula, mildly comminuted and displaced fracture of the posterior malleolus involving approximately 40% of the tibiotalar joint surface. We thoroughly educated the patient on the details of her injury and the surgical plan. The patient followed up in our clinic once soft tissues were amenable to surgical intervention we discussed open reduction internal fixation of the left ankle as stage II of our treatment plan including all the risk, benefits, alternatives. Given the displacement of the fracture, risk of post traumatic arthritis, and severe instability of this injury we recommend surgical intervention in order to stabilize the fracture and improve position to help restore anatomic alignment. All questions were answered to the patient's satisfaction. She verbalizes and demonstrates understanding. No guarantees were implied or given. She provided written informed consent which was placed in the chart. Covid testing is negative. The anesthesia providers performed a preoperative regional nerve block. Please refer to their documentation for full details.      PROCEDURE IN DETAIL:  Under mild sedation the patient was brought to the operating room and left on the cart. General anesthesia was administered by the Anesthesia team without complication. The patient was then transitioned to the operating table in a prone position on gel rolls. All bony prominences were well-padded. We confirmed with the Anesthesia team that they had adequate access to her airway and IV lines. The patient was then secured to the bed with a safety strap across the lap. A well-padded pneumatic tourniquet was applied to her left thigh. The left lower extremity was then prepped, scrubbed, draped in usual aseptic fashion. A timeout was performed confirming the correct patient, correct site, correct procedure. Everyone in the room was in agreement. The right lower extremity was exsanguinated using Esmarch bandage and the pneumatic thigh tourniquet was inflated to 300 mmHg and would remain inflated for 120 minutes throughout the procedure. Attention was directed to the posterior lateral aspect of the left ankle where there was noted to be a palpable prominence correlating with the proximal end of the distal fibular fracture. We then completed a standard posterior lateral approach to the ankle. A curvilinear incision was made between the medial border of the posterior fibula and the lateral border of the Achilles tendon utilizing a #15 blade. A combination of sharp and blunt dissection was utilized through the skin and subcutaneous tissue until the small saphenous vein and the sural nerve were identified and bluntly dissected in order to be mobilized and protected throughout the procedure, both structures were retracted medially. The incision was deepened to level of fascia until the fascia between the FHL and the peroneal tendons was identified.   The fascia was then incised and the FHL and peroneal tendons were meticulously dissected away from each other, the peroneal tendons were retracted laterally, and the FHL muscle belly was retracted medially which also provided protection for the medial neurovascular bundle. This provided excellent visualization of the posterior aspect of the fibula and the distal tibia. With attention focused to the fibula a comminuted displaced distal fibular fracture was identified. The periosteum immediately surrounding the fracture borders were dissected to allow for full visualization of the fracture. The fracture bed was cleared of all hematoma and debris. Upon further inspection the posterior inferior tibiofibular ligament was noted to be intact and the peroneal artery was identified in the distal extent of the surgical site. Then 2 pointed reduction clamps were utilized to directly manipulate the fracture until it was anatomically reduced and compressed. Provisional k-wires were placed for additional stability to fracture reduction. This was confirmed with direct visualization of the fracture as well as under fluoroscopic imaging. We then placed two 3.0 millimeter screws and a 2.0 millimeter screw in a lag by technique fashion to further compress and stabilize the fracture site. There was excellent purchase as well as compression across the fracture site. The pointed reduction clamps and all provisional k-wires were then removed and maintenance of the fracture reduction was noted. We then moved forward with our fixation with use of a [employer] 10 hole one third tubular plate which was appropriately contoured to fit the anatomy and function as a anti-glide plate. We placed the plate as far posteriorly as possible to allow for buttressing effect of the fracture. The plate was then secured to the fibula distally with three 3.5 mm locking screws and secured proximally with four 3.5 mm locking screws. At this time we obtained AP, mortise, lateral radiographs of the left ankle which showed appropriate reduction and safe placement of hardware in all views.       We then turned our attention to the large displaced posterior malleolar fracture. With the posterior inferior tibiofibular ligament intact we felt that fixation of the posterior malleolar fragment would restore anatomic alignment to the tibiotalar articular surface as well as provide fixation and stability of the syndesmosis. Once again the periosteum immediately surrounding the fracture borders were dissected to allow for full visualization of the fracture. The fracture bed was cleared of all hematoma and debris. The posterior malleolar fragment was then directly manipulated until it was anatomically reduced and compressed. Our reduction was provisionally held in place with several olive wires placed through our selected [place] Y plate. This was confirmed with direct visualization of the fracture as well as under fluoroscopic imaging. We then move forward with our fixation with use of a Adrian 8 hole Y plate which was a probably contoured to fit the anatomy to function as an antiglide plate. We then placed a 3.5 millimeter screw in a lag by technique fashion through the distal plate to further compress and stabilize the fracture site. There was excellent purchase as well as compression across the fracture site. All provisional wires removed at this time. The plate was then secured to the distal tibia distally with three 3.5 mm locking screws and secured proximally with three 3.5 mm locking screws. At this time final images were obtained in the AP, mortise, lateral views of the left ankle to confirm appropriate hardware placement and acceptable fracture reduction. Stress testing of the ankle was performed after hardware implantation confirming improved stability. The surgical site was then irrigated with copious amounts of sterile saline. The surgical site was then closed in layers utilizing 2-0 Vicryl for deep and fascial closure, 3-0 Vicryl for subcutaneous closure, 3-0 nylon for skin closure.     Dressings were applied consisting of Adaptic, gauze 4 x 4's, Kerlix, ABD's, Ace bandages, short leg posterior splint. Patient tolerated above procedure and anesthesia well without complication. The patient was transferred from the operating room to the PACU with vital signs stable and neurovascular status intact to left lower extremity. The patient was counseled at length about the risks of herminia Covid-19 during their perioperative period and any recovery window from their procedure. The patient was made aware that herminia Covid-19  may worsen their prognosis for recovering from their procedure  and lend to a higher morbidity and/or mortality risk. All material risks, benefits, and reasonable alternatives including postponing the procedure were discussed. The patient does wish to proceed with the procedure at this time.     Linn Braun DPM   Podiatric Medicine & Surgery   2/19/2021 at 4:08 PM

## 2021-02-19 NOTE — H&P
HISTORY and [facility] [place] [facility]       NAME:  Lito Walker  MRN: [id number]   YOB: 1980   Date: 2/19/2021   Age: 41 y.o. Gender: female       COMPLAINT AND PRESENT HISTORY:     Lito Walker is 41 y.o.,  female, here for left ankle fracture with scheduled left ankle open reduction internal fixation. Pt admitted to hospital after mechanical fall and left ankle pain. Pt fell from standing height onto snowy ground and immediately felt pain to left ankle with inability to bear weight. Pt denies hitting her head or losing consciousness. Imaging in ED revealed closed ankle fracture. Delta frame was applied to left ankle on 02/08/2021. Pt currently having left ankle pain. Pain is described as constant. Rating pain 8/10. Takes percocet with good relief. Any activity aggravates pain. Elevation of left lower extremity helps alleviate pain. Denies numbness or tingling to left foot or leg. Pain does radiate to left anterior lower leg, lateral side of left knee and into left hip. No additional falls, injury or trauma to left ankle. NWB LLE.     NPO. No medications taken this am. Last took Xarelto 10mg PO yesterday at 1100am or 1200 noon. Last 30mg lovenox injection was two days ago. Pre-op RN reported speaking to Dr. Butch Cronin regarding Pt taking Xarelto after preoperative phone call to Pt yesterday. Pt on anticoagulation for risk of DVT due to prolonged immobilization. Pt denies recent or current chest pain/pressure, palpitations, SOB, recent URI, fever or chills.      PAST MEDICAL HISTORY     Past Medical History:   Diagnosis Date    Anemia age 10    Bimalleolar ankle fracture, left, closed, initial encounter 2/8/2021    Chronic midline low back pain without sciatica 3/14/2019    Class 1 obesity due to excess calories with body mass index (BMI) of 32.0 to 32.9 in adult 3/14/2019    Psoriasis        SURGICAL HISTORY       Past Surgical History:   Procedure Laterality Date    ANKLE SURGERY Left 2021    ANKLE EXTERNAL FIXATOR APPLICATION DELTA FRAME performed by Aramis Alfred DPM at [facility]  2018    FRACTURE SURGERY  age 23,     Right little finger pins where ?  TUBAL LIGATION  17    WISDOM TOOTH EXTRACTION      Upper level       FAMILY HISTORY       Family History   Problem Relation Age of Onset    Colon Cancer Maternal Grandfather         60-70s    Breast Cancer Other         maternal great aunt age of onset uncertain    High Cholesterol Mother     Lung Cancer Father     Cervical Cancer Maternal Grandmother     Emphysema Paternal Grandmother     Mult Sclerosis Maternal Aunt     Cancer Neg Hx     Diabetes Neg Hx     Eclampsia Neg Hx     Hypertension Neg Hx     Ovarian Cancer Neg Hx      Labor Neg Hx     Spont Abortions Neg Hx     Stroke Neg Hx        SOCIAL HISTORY       Social History     Socioeconomic History    Marital status: Single     Spouse name: Not on file    Number of children: Not on file    Years of education: Not on file    Highest education level: Not on file   Occupational History    Not on file   Social Needs    Financial resource strain: Not on file    Food insecurity     Worry: Not on file     Inability: Not on file    Transportation needs     Medical: Not on file     Non-medical: Not on file   Tobacco Use    Smoking status: Current Every Day Smoker     Packs/day: 0.50     Years: 25.00     Pack years: 12.50     Types: Cigarettes     Start date: 3/5/1994    Smokeless tobacco: Never Used    Tobacco comment: 1/2 pack daily as of 3/5/19   Substance and Sexual Activity    Alcohol use:  Yes     Alcohol/week: 0.8 standard drinks     Types: 1 Standard drinks or equivalent per week     Frequency: 2-4 times a month     Comment: 8-12 beers on  as of 3/    Drug use: Yes     Types: Marijuana    Sexual activity: Yes     Partners: Male     Birth control/protection: Surgical Lifestyle    Physical activity     Days per week: Not on file     Minutes per session: Not on file    Stress: Not on file   Relationships    Social connections     Talks on phone: Not on file     Gets together: Not on file     Attends Quaker service: Not on file     Active member of club or organization: Not on file     Attends meetings of clubs or organizations: Not on file     Relationship status: Not on file    Intimate partner violence     Fear of current or ex partner: Not on file     Emotionally abused: Not on file     Physically abused: Not on file     Forced sexual activity: Not on file   Other Topics Concern    Not on file   Social History Narrative    Not on file        REVIEW OF SYSTEMS      No Known Allergies    No current facility-administered medications on file prior to encounter. Current Outpatient Medications on File Prior to Encounter   Medication Sig Dispense Refill    enoxaparin (LOVENOX) 30 MG/0.3ML injection Inject 0.3 mLs into the skin 2 times daily 20 Syringe 0    cyclobenzaprine (FLEXERIL) 5 MG tablet Take 2 tablets by mouth 2 times daily as needed for Muscle spasms 10 tablet 0    ibuprofen (ADVIL;MOTRIN) 600 MG tablet Take 1 tablet by mouth every 6 hours as needed for Pain. (Patient not taking: Reported on 2/15/2021) 30 tablet 0   Notation: Above medications are not currently reconciled at time of signing this H&P note, to be reconciled in pre-op per RN. Negative except for what is mentioned in the HPI. GENERAL PHYSICAL EXAM     Vitals: Review vitals per RN flowsheet. GENERAL APPEARANCE:   Mellody Dandy is 41 y.o.,  female, nourished, conscious, alert. Does not appear to be in distress or pain at this time. SKIN:  Warm, dry, no cyanosis or jaundice. HEAD:  Normocephalic, atraumatic. EYES:  Pupils equal, reactive to light. EARS:  No discharge, no marked hearing loss. NOSE:  No rhinorrhea, epistaxis or septal deformity. THROAT:  Not congested. No ulceration bleeding or discharge. NECK:  No stiffness, trachea central.  No palpable masses or L.N.                 CHEST:  Symmetrical and equal on expansion. HEART:  RRR S1 > S2. No audible murmurs or gallops. LUNGS:  Equal on expansion, normal breath sounds. No wheezing, rhonchi or rales. ABDOMEN:  Soft on palpation. No localized tenderness. No guarding or rigidity. LOCOMOTOR, BACK AND SPINE:  Pt in wheelchair at this time. Gait not observed. No tenderness or deformities to RLE. EXTREMITIES:  Left ankle delta frame in place, ace bandage wrapped around frame and foot. left toes warm, capillary refill brisk. Pt able to move toes. No tenderness or edema to RLE. NEUROLOGIC:  The patient is conscious, alert, oriented. No facial drooping. Speech clear. No apparent focal sensory or motor deficits.              PROVISIONAL DIAGNOSES / SURGERY:      LEFT ANKLE FRACTURE    ANKLE OPEN REDUCTION INTERNAL FIXATION Left    Patient Active Problem List    Diagnosis Date Noted    Family planning 11/11/2011     Priority: High    Smoker 09/16/2011     Priority: High    Bimalleolar ankle fracture, left, closed, initial encounter 02/08/2021    Alcohol use 02/08/2021    Chronic midline low back pain without sciatica 03/14/2019    Class 1 obesity due to excess calories with body mass index (BMI) of 32.0 to 32.9 in adult 03/14/2019    Tobacco use 03/14/2019    Tobacco abuse counseling 03/14/2019    Dry heaves 03/14/2019    Consumes alcohol at social events 03/14/2019           YFN Bryant CNP on 2/19/2021 at 10:30 AM

## 2021-02-19 NOTE — ANESTHESIA PRE PROCEDURE
Department of Anesthesiology  Preprocedure Note       Name:  Sharlet Cowden   Age:  41 y.o.  :  1980                                          MRN:  [id number]         Date:  2021      Surgeon: Miguel Moncada):  Brigida Bamberger, DPM    Procedure: Procedure(s):  ANKLE OPEN REDUCTION INTERNAL FIXATION    Medications prior to admission:   Prior to Admission medications    Medication Sig Start Date End Date Taking? Authorizing Provider   cyclobenzaprine (FLEXERIL) 5 MG tablet Take 2 tablets by mouth 2 times daily as needed for Muscle spasms 21 Yes Howard Valle DPM   ibuprofen (ADVIL;MOTRIN) 600 MG tablet Take 1 tablet by mouth every 6 hours as needed for Pain. 10/25/14  Yes Stefanie Cruz MD   oxyCODONE-acetaminophen (PERCOCET)  MG per tablet Take 1 tablet by mouth every 8 hours as needed for Pain (Take every 4-6 hours as needed for post-op pain) for up to 3 days.  Intended supply: 30 days 21  Flores Mcdonald DO   rivaroxaban (XARELTO) 10 MG TABS tablet Take 1 tablet by mouth daily (with breakfast) 2/15/21   Brigida Bamberger, DPM   enoxaparin (LOVENOX) 30 MG/0.3ML injection Inject 0.3 mLs into the skin 2 times daily 21   Jo James DPM       Current medications:    Current Facility-Administered Medications   Medication Dose Route Frequency Provider Last Rate Last Admin    lactated ringers infusion   Intravenous Continuous Particsola Clark MD        sodium chloride flush 0.9 % injection 10 mL  10 mL Intravenous 2 times per day Particsola Clark MD        sodium chloride flush 0.9 % injection 10 mL  10 mL Intravenous PRN Particsola Clark MD        lidocaine PF 1 % injection 1 mL  1 mL Intradermal Once PRN Nirav Clark MD        ceFAZolin (ANCEF) 2000 mg in dextrose 5 % 50 mL IVPB  2,000 mg Intravenous Once Bandar Zheng DPM           Allergies:  No Known Allergies    Problem List:    Patient Active Problem List   Diagnosis Code  Smoker F17.200    Family planning Z30.09    Chronic midline low back pain without sciatica M54.5, G89.29    Class 1 obesity due to excess calories with body mass index (BMI) of 32.0 to 32.9 in adult E66.09, Z68.32    Tobacco use Z72.0    Tobacco abuse counseling Z71.6    Dry heaves R11.10    Consumes alcohol at social events Z78.9    Bimalleolar ankle fracture, left, closed, initial encounter S82.842A    Alcohol use Z72.89       Past Medical History:        Diagnosis Date    Anemia age 13    Bimalleolar ankle fracture, left, closed, initial encounter 2/8/2021    Chronic midline low back pain without sciatica 3/14/2019    Class 1 obesity due to excess calories with body mass index (BMI) of 32.0 to 32.9 in adult 3/14/2019    Psoriasis        Past Surgical History:        Procedure Laterality Date    ANKLE SURGERY Left 2/8/2021    ANKLE EXTERNAL FIXATOR APPLICATION DELTA FRAME performed by Gaviota Blount DPM at [facility]  12/18/2018    FRACTURE SURGERY  age 23, 2001    Right little finger pins where ?  TUBAL LIGATION  12/18/19    WISDOM TOOTH EXTRACTION      Upper level       Social History:    Social History     Tobacco Use    Smoking status: Current Every Day Smoker     Packs/day: 0.50     Years: 25.00     Pack years: 12.50     Types: Cigarettes     Start date: 3/5/1994    Smokeless tobacco: Never Used    Tobacco comment: 1/2 pack daily as of 3/5/19   Substance Use Topics    Alcohol use:  Yes     Alcohol/week: 0.8 standard drinks     Types: 1 Standard drinks or equivalent per week     Frequency: 2-4 times a month     Comment: 8-12 beers on Saurday as of 3/                                Ready to quit: Not Answered  Counseling given: Not Answered  Comment: 1/2 pack daily as of 3/5/19      Vital Signs (Current):   Vitals:    02/19/21 1054   BP: 125/82   Pulse: 98   Resp: 18   Temp: 97 °F (36.1 °C)   TempSrc: Infrared   SpO2: 97%   Weight: 213 lb (96.6 kg) Height: 5' 10\" (1.778 m)                                              BP Readings from Last 3 Encounters:   02/19/21 125/82   02/16/21 111/76   02/09/21 92/64       NPO Status:                                                                                 BMI:   Wt Readings from Last 3 Encounters:   02/19/21 213 lb (96.6 kg)   02/16/21 213 lb (96.6 kg)   02/08/21 223 lb (101.2 kg)     Body mass index is 30.56 kg/m². CBC:   Lab Results   Component Value Date    WBC 7.3 02/09/2021    RBC 3.62 02/09/2021    HGB 11.3 02/09/2021    HCT 33.7 02/09/2021    MCV 92.9 02/09/2021    RDW 12.7 02/09/2021     02/09/2021       CMP:   Lab Results   Component Value Date     02/09/2021    K 4.0 02/09/2021     02/09/2021    CO2 23 02/09/2021    BUN 10 02/09/2021    CREATININE 0.48 02/09/2021    GFRAA >60 02/09/2021    LABGLOM >60 02/09/2021    GLUCOSE 99 02/09/2021    CALCIUM 8.6 02/09/2021       POC Tests: No results for input(s): POCGLU, POCNA, POCK, POCCL, POCBUN, POCHEMO, POCHCT in the last 72 hours.     Coags:   Lab Results   Component Value Date    PROTIME 12.5 02/08/2021    INR 0.9 02/08/2021    APTT 30.0 02/08/2021       HCG (If Applicable):   Lab Results   Component Value Date    PREGTESTUR POSITIVE (A) 04/24/2018    HCGQUANT <1 02/08/2021        ABGs: No results found for: PHART, PO2ART, ZGU0YEU, WRF5NIA, BEART, S4LFOYMF     Type & Screen (If Applicable):  No results found for: LABABO, LABRH    Drug/Infectious Status (If Applicable):  No results found for: HIV, HEPCAB    COVID-19 Screening (If Applicable):   Lab Results   Component Value Date    COVID19 Not Detected 02/15/2021         Anesthesia Evaluation  Patient summary reviewed and Nursing notes reviewed no history of anesthetic complications:   Airway: Mallampati: II  TM distance: >3 FB   Neck ROM: full  Mouth opening: > = 3 FB Dental:          Pulmonary: breath sounds clear to auscultation  (+) current smoker

## 2021-02-19 NOTE — ANESTHESIA PROCEDURE NOTES
Peripheral Block    Patient location during procedure: OR  Start time: 2/19/2021 12:32 PM  End time: 2/19/2021 12:32 PM  Staffing  Anesthesiologist: Janna Austin MD  Preanesthetic Checklist  Completed: patient identified, IV checked, site marked, risks and benefits discussed, surgical consent, monitors and equipment checked, pre-op evaluation, timeout performed, anesthesia consent given, oxygen available and patient being monitored  Peripheral Block  Patient position: prone  Prep: ChloraPrep  Patient monitoring: cardiac monitor, continuous pulse ox, continuous capnometry, frequent blood pressure checks and IV access  Block type: Sciatic  Laterality: right  Injection technique: single-shot  Guidance: ultrasound guided  Popliteal  Provider prep: mask and sterile gloves  Needle  Needle type: short-bevel   Needle gauge: 22 G  Needle length: 8 cm  Needle localization: ultrasound guidance  Test dose: negative  Assessment  Injection assessment: negative aspiration for heme, no paresthesia on injection and local visualized surrounding nerve on ultrasound  Slow fractionated injection: yes  Hemodynamics: stable  Medications Administered  Bupivacaine (MARCAINE) PF injection 0.5%, 15 mL  bupivacaine liposome (EXPAREL) injection 1.3%, 5 mL  Reason for block: post-op pain management

## 2021-02-19 NOTE — PROGRESS NOTES
After 120 minutes the tourniquet alarmed. Dr. Zachery Mosqueda requests 5 more minutes. After 5 addition minutes the tourniquet was taken down.

## 2021-02-19 NOTE — PROGRESS NOTES
X-RAY results in. Dr Hilda Phoenix notified, per [employer], and patient OK'ed for discharge. Awaiting patient's ride.

## 2021-02-22 ENCOUNTER — TELEPHONE (OUTPATIENT)
Dept: PODIATRY | Age: 41
End: 2021-02-22

## 2021-02-22 ENCOUNTER — OFFICE VISIT (OUTPATIENT)
Dept: PODIATRY | Age: 41
End: 2021-02-22
Payer: COMMERCIAL

## 2021-02-22 VITALS — HEIGHT: 70 IN | WEIGHT: 213 LBS | BODY MASS INDEX: 30.49 KG/M2

## 2021-02-22 DIAGNOSIS — S93.05XD DISLOCATION OF LEFT ANKLE JOINT, SUBSEQUENT ENCOUNTER: ICD-10-CM

## 2021-02-22 DIAGNOSIS — Z98.890 POST-OPERATIVE STATE: ICD-10-CM

## 2021-02-22 DIAGNOSIS — S82.392D CLOSED FRACTURE OF POSTERIOR MALLEOLUS OF LEFT TIBIA WITH ROUTINE HEALING, SUBSEQUENT ENCOUNTER: ICD-10-CM

## 2021-02-22 DIAGNOSIS — M25.572 ACUTE LEFT ANKLE PAIN: ICD-10-CM

## 2021-02-22 DIAGNOSIS — S82.832D OTHER CLOSED FRACTURE OF DISTAL END OF LEFT FIBULA WITH ROUTINE HEALING, SUBSEQUENT ENCOUNTER: Primary | ICD-10-CM

## 2021-02-22 DIAGNOSIS — R60.0 EDEMA OF LOWER EXTREMITY: ICD-10-CM

## 2021-02-22 PROCEDURE — L4360 PNEUMAT WALKING BOOT PRE CST: HCPCS | Performed by: PODIATRIST

## 2021-02-22 PROCEDURE — 99024 POSTOP FOLLOW-UP VISIT: CPT | Performed by: PODIATRIST

## 2021-02-22 ASSESSMENT — ENCOUNTER SYMPTOMS
SHORTNESS OF BREATH: 0
BACK PAIN: 0
COLOR CHANGE: 0
NAUSEA: 0
DIARRHEA: 0

## 2021-02-22 NOTE — PROGRESS NOTES
Subjective: Patient presents to the office today status-post ORIF ankle fracture left. Patient states they have kept the dressing to the left lower extremity clean, dry, and intact since the surgery. Patient states that she has remained strictly NWB to the RLE as instructed since her surgery. Patient states that their pain has been well-controlled with the prescribed pain medication. Patient states that they have been taking their oral antibiotics as prescribed. Patient denies any fever, chills, nausea, vomiting, or night sweats. Review of Systems   Constitutional: Negative for activity change, appetite change, chills, diaphoresis, fatigue and fever. Respiratory: Negative for shortness of breath. Cardiovascular: Negative for leg swelling. Gastrointestinal: Negative for diarrhea and nausea. Endocrine: Negative for cold intolerance, heat intolerance and polyuria. Musculoskeletal: Positive for gait problem and joint swelling. Negative for arthralgias, back pain and myalgias. Skin: Positive for wound. Negative for color change, pallor and rash. Allergic/Immunologic: Negative for environmental allergies and food allergies. Neurological: Negative for dizziness, weakness, light-headedness and numbness. Hematological: Does not bruise/bleed easily. Psychiatric/Behavioral: Negative for behavioral problems, confusion and self-injury. The patient is not nervous/anxious. Objective: Clinical evaluation of the patient reveals dressing to the left lower extremity to be clean, dry, and intact. Removal of the dressing reveals incision to be well-coapted and the sutures intact. There is erythema surrounding the incision site, but it does not extend beyond this area. There is no calor, drainage, or malodor noted to the surgical site. There is moderate non-pitting edema present to the left lower extremity. Clinically, there is adequate alignment noted to the left foot and ankle.  X-ray's taken: AP, Lateral, and Medial Oblique of the left ankle. Findings: There is adequate reduction of the fractures with intact internal fixation noted. Assessment:    Diagnosis Orders   1. Other closed fracture of distal end of left fibula with routine healing, subsequent encounter  XR ANKLE LEFT (MIN 3 VIEWS)    OK PNEUMAT WALKING BOOT PRE CST   2. Closed fracture of posterior malleolus of left tibia with routine healing, subsequent encounter  XR ANKLE LEFT (MIN 3 VIEWS)    OK PNEUMAT WALKING BOOT PRE CST   3. Dislocation of left ankle joint, subsequent encounter  XR ANKLE LEFT (MIN 3 VIEWS)    OK PNEUMAT WALKING BOOT PRE CST   4. Edema of lower extremity  XR ANKLE LEFT (MIN 3 VIEWS)    OK PNEUMAT WALKING BOOT PRE CST   5. Acute left ankle pain  XR ANKLE LEFT (MIN 3 VIEWS)    OK PNEUMAT WALKING BOOT PRE CST   6. Post-operative state  XR ANKLE LEFT (MIN 3 VIEWS)    OK PNEUMAT WALKING BOOT PRE CST     Plan: Antibiotic ointment was placed along the incision and a dry sterile dressing was applied. The patient was instructed to keep the dressing clean, dry, and intact until the next visit. I have dispensed a pneumatic equalizer walker to the patient's left lower extremity. Due to the patient's diagnosis and related symptoms this is medically necessary for the treatment. The function of this device is to restrict and limit motion and provide stabilization and compression to the affected area. Specific instructions on weight bearing and ROM exercises were discussed and given to the patient. Patient to wear this boot at all times, even when sleeping, and is to remain strictly NWB to the RLE at all times. The goals and function of this device were explained in detail to the patient. Upon dispensing, the device appeared to be fitting well and the patient states that the device is comfortable at this time. The patient was shown how to properly apply, wear, and care for the device.  The patient was able to properly apply the device and ambulate with it without distress. At the time that the device was dispensed it was suitable for the patient's condition and was not substandard. No guarantees were given or implied and the precautions of the device were reviewed the patient. Written instructions and warranty information was given along with the list of the twenty-one (21) Durable Medical Equipment Supplier Guidelines. All patient questions were answered satisfactorily. Patient was instructed on proper rest, ice, compression, and elevation of the left lower extremity. The patient is to follow up in the office as previously scheduled for likely suture removal. Return in about 2 weeks (around 3/8/2021) for Second postoperative evaluation.    2/22/2021      Lorne Ahumada, DPM

## 2021-02-22 NOTE — TELEPHONE ENCOUNTER
Recvd phone call from Pt's  ?ing if Dr. Joan Duane is sending over his wife's Rx for Percocet 5MG.  stated Rx needs to be sent to [facility] in [place], [place]. I explained to pt's  that Dary Resendez has already put the request in.

## 2021-02-23 LAB — SURGICAL PATHOLOGY REPORT: NORMAL

## 2021-03-01 NOTE — PROGRESS NOTES
X-RAY here to do repeat ankle X-RAY. Patient tolerated well. Simponi Pregnancy And Lactation Text: The risk during pregnancy and breastfeeding is uncertain with this medication.

## 2021-03-08 ENCOUNTER — OFFICE VISIT (OUTPATIENT)
Dept: PODIATRY | Age: 41
End: 2021-03-08

## 2021-03-08 VITALS — BODY MASS INDEX: 30.49 KG/M2 | HEIGHT: 70 IN | WEIGHT: 213 LBS

## 2021-03-08 DIAGNOSIS — S82.392D CLOSED FRACTURE OF POSTERIOR MALLEOLUS OF LEFT TIBIA WITH ROUTINE HEALING, SUBSEQUENT ENCOUNTER: ICD-10-CM

## 2021-03-08 DIAGNOSIS — S93.05XD DISLOCATION OF LEFT ANKLE JOINT, SUBSEQUENT ENCOUNTER: ICD-10-CM

## 2021-03-08 DIAGNOSIS — M25.572 ACUTE LEFT ANKLE PAIN: ICD-10-CM

## 2021-03-08 DIAGNOSIS — Z98.890 POST-OPERATIVE STATE: ICD-10-CM

## 2021-03-08 DIAGNOSIS — S82.832D OTHER CLOSED FRACTURE OF DISTAL END OF LEFT FIBULA WITH ROUTINE HEALING, SUBSEQUENT ENCOUNTER: Primary | ICD-10-CM

## 2021-03-08 DIAGNOSIS — R60.0 EDEMA OF LOWER EXTREMITY: ICD-10-CM

## 2021-03-08 PROCEDURE — 99024 POSTOP FOLLOW-UP VISIT: CPT | Performed by: PODIATRIST

## 2021-03-08 ASSESSMENT — ENCOUNTER SYMPTOMS
COLOR CHANGE: 0
DIARRHEA: 0
NAUSEA: 0
SHORTNESS OF BREATH: 0
BACK PAIN: 0

## 2021-03-08 NOTE — PROGRESS NOTES
Subjective: Patient presents to the office today for their second post-operative evaluation of ORIF trimalleolar fracture left. Patient states that they have kept the dressing to the left lower extremity clean, dry, and intact since last visit. Patient states that she has remained NWB to the LLE as instructed since last visit. Patient states that she has not been wearing her CAM walker when sleeping. Patient states that their pain continues to be well controlled with the prescribed pain medication. Review of Systems   Constitutional: Negative for activity change, appetite change, chills, diaphoresis, fatigue and fever. Respiratory: Negative for shortness of breath. Cardiovascular: Negative for leg swelling. Gastrointestinal: Negative for diarrhea and nausea. Endocrine: Negative for cold intolerance, heat intolerance and polyuria. Musculoskeletal: Positive for gait problem and joint swelling. Negative for arthralgias, back pain and myalgias. Skin: Positive for wound. Negative for color change, pallor and rash. Allergic/Immunologic: Negative for environmental allergies and food allergies. Neurological: Negative for dizziness, weakness, light-headedness and numbness. Hematological: Does not bruise/bleed easily. Psychiatric/Behavioral: Negative for behavioral problems, confusion and self-injury. The patient is not nervous/anxious. Objective: Clinical evaluation of the patient reveals dressing to the left lower extremity to be clean, dry, and intact. Removal of the dressing reveals incision to be well co-apted and the sutures intact. Tension placed along the incision line does not produce any gapping. There is mild erythema remaining around the incision site, but it does not extend beyond this area. There is no calor, drainage, or malodor noted to the surgical site. There remains mild nonpitting edema to the left lower extremity. There is adequate alignment noted to the left ankle.  The sutures were removed and tension was once again placed along the incision line. Again, no gapping was observed. Assessment:    Diagnosis Orders   1. Other closed fracture of distal end of left fibula with routine healing, subsequent encounter     2. Closed fracture of posterior malleolus of left tibia with routine healing, subsequent encounter     3. Dislocation of left ankle joint, subsequent encounter     4. Edema of lower extremity     5. Acute left ankle pain     6. Post-operative state       Plan: The patient was advised that they may begin bathing their left lower extremity tomorrow. However, they are cautioned against any aggressive scrubbing of the incision site as this may reopen the wound. Patient instructed to remain strictly NWB to the LLE at all times. Return in about 4 weeks (around 4/5/2021) for third post op ORIF left ankle with xrays. Patient will have xrays taken on this next visit.     Rj Mireles DPM

## 2021-04-12 ENCOUNTER — OFFICE VISIT (OUTPATIENT)
Dept: PODIATRY | Age: 41
End: 2021-04-12

## 2021-04-12 VITALS — WEIGHT: 213 LBS | HEIGHT: 70 IN | BODY MASS INDEX: 30.49 KG/M2

## 2021-04-12 DIAGNOSIS — R60.0 EDEMA OF LOWER EXTREMITY: ICD-10-CM

## 2021-04-12 DIAGNOSIS — S93.05XD DISLOCATION OF LEFT ANKLE JOINT, SUBSEQUENT ENCOUNTER: ICD-10-CM

## 2021-04-12 DIAGNOSIS — S82.392D CLOSED FRACTURE OF POSTERIOR MALLEOLUS OF LEFT TIBIA WITH ROUTINE HEALING, SUBSEQUENT ENCOUNTER: ICD-10-CM

## 2021-04-12 DIAGNOSIS — M25.572 ACUTE LEFT ANKLE PAIN: ICD-10-CM

## 2021-04-12 DIAGNOSIS — S82.832D OTHER CLOSED FRACTURE OF DISTAL END OF LEFT FIBULA WITH ROUTINE HEALING, SUBSEQUENT ENCOUNTER: Primary | ICD-10-CM

## 2021-04-12 DIAGNOSIS — Z98.890 POST-OPERATIVE STATE: ICD-10-CM

## 2021-04-12 PROCEDURE — 99024 POSTOP FOLLOW-UP VISIT: CPT | Performed by: PODIATRIST

## 2021-04-12 NOTE — PROGRESS NOTES
Subjective: Patient presents to the office today for post-operative evaluation of ORIF left trimalleolar ankle fracture. Patient states that they have kept the dressing to the left lower extremity clean, dry, and intact since last visit. Patient states that she has remained NWB to the LLE as instructed since last visit. Patient states that their pain continues to be well controlled with the prescribed pain medication. Review of Systems   Constitutional: Negative for activity change, appetite change, chills, diaphoresis, fatigue and fever. Respiratory: Negative for shortness of breath. Cardiovascular: Negative for leg swelling. Gastrointestinal: Negative for diarrhea and nausea. Endocrine: Negative for cold intolerance, heat intolerance and polyuria. Musculoskeletal: Positive for gait problem and joint swelling. Negative for arthralgias, back pain and myalgias. Skin: Negative for color change, pallor, rash and wound. Allergic/Immunologic: Negative for environmental allergies and food allergies. Neurological: Negative for dizziness, weakness, light-headedness and numbness. Hematological: Does not bruise/bleed easily. Psychiatric/Behavioral: Negative for behavioral problems, confusion and self-injury. The patient is not nervous/anxious. Objective: Clinical evaluation of the patient reveals adequate alignment to the left ankle. There remains moderated edema to the left ankle. There is pain with palpation to the medial and lateral aspects of the left ankle. Surgical incisions remain well healed. Active and passive range of motion of the left ankle is mildly painful, but no grinding is noted. X-ray's taken: AP, Lateral, and Medial Oblique of the left ankle. Findings: There is adequate alignment with intact internal fixation to the tibia and fibula. There is nearly complete osseous union to all fractures, except one are to the fibula, which remains open.  However, there is some osseous bridging

## 2021-04-13 ASSESSMENT — ENCOUNTER SYMPTOMS
COLOR CHANGE: 0
NAUSEA: 0
SHORTNESS OF BREATH: 0
DIARRHEA: 0
BACK PAIN: 0

## 2021-04-26 ENCOUNTER — OFFICE VISIT (OUTPATIENT)
Dept: PODIATRY | Age: 41
End: 2021-04-26

## 2021-04-26 VITALS — WEIGHT: 213 LBS | BODY MASS INDEX: 30.49 KG/M2 | HEIGHT: 70 IN

## 2021-04-26 DIAGNOSIS — S93.05XD DISLOCATION OF LEFT ANKLE JOINT, SUBSEQUENT ENCOUNTER: ICD-10-CM

## 2021-04-26 DIAGNOSIS — M25.572 ACUTE LEFT ANKLE PAIN: ICD-10-CM

## 2021-04-26 DIAGNOSIS — S82.392D CLOSED FRACTURE OF POSTERIOR MALLEOLUS OF LEFT TIBIA WITH ROUTINE HEALING, SUBSEQUENT ENCOUNTER: ICD-10-CM

## 2021-04-26 DIAGNOSIS — Z98.890 POST-OPERATIVE STATE: ICD-10-CM

## 2021-04-26 DIAGNOSIS — S82.832D OTHER CLOSED FRACTURE OF DISTAL END OF LEFT FIBULA WITH ROUTINE HEALING, SUBSEQUENT ENCOUNTER: Primary | ICD-10-CM

## 2021-04-26 DIAGNOSIS — R60.0 EDEMA OF LOWER EXTREMITY: ICD-10-CM

## 2021-04-26 PROCEDURE — 99024 POSTOP FOLLOW-UP VISIT: CPT | Performed by: PODIATRIST

## 2021-04-27 ASSESSMENT — ENCOUNTER SYMPTOMS
BACK PAIN: 0
COLOR CHANGE: 0
DIARRHEA: 0
NAUSEA: 0
SHORTNESS OF BREATH: 0

## 2021-04-27 NOTE — PROGRESS NOTES
internal fixation to the tibia and fibula. There is adequate osseous union to all fractures today. Assessment:    Diagnosis Orders   1. Other closed fracture of distal end of left fibula with routine healing, subsequent encounter  XR ANKLE LEFT (MIN 3 VIEWS)   2. Closed fracture of posterior malleolus of left tibia with routine healing, subsequent encounter  XR ANKLE LEFT (MIN 3 VIEWS)   3. Dislocation of left ankle joint, subsequent encounter  XR ANKLE LEFT (MIN 3 VIEWS)   4. Edema of lower extremity  XR ANKLE LEFT (MIN 3 VIEWS)   5. Acute left ankle pain  XR ANKLE LEFT (MIN 3 VIEWS)   6. Post-operative state  XR ANKLE LEFT (MIN 3 VIEWS)         Plan: 1. Clinical evaluation of the patient. 2. Patient informed that she is healing adequately and may continue with FWB to the LLE, but must wear her CAM walker when doing so. 3. Return in about 2 weeks (around 5/10/2021) for post op left ankle fracture.    4/26/2021      Juan Fitzpatrick DPM

## 2021-05-10 ENCOUNTER — OFFICE VISIT (OUTPATIENT)
Dept: PODIATRY | Age: 41
End: 2021-05-10

## 2021-05-10 VITALS — BODY MASS INDEX: 30.49 KG/M2 | WEIGHT: 213 LBS | HEIGHT: 70 IN

## 2021-05-10 DIAGNOSIS — S82.392D CLOSED FRACTURE OF POSTERIOR MALLEOLUS OF LEFT TIBIA WITH ROUTINE HEALING, SUBSEQUENT ENCOUNTER: ICD-10-CM

## 2021-05-10 DIAGNOSIS — S93.05XD DISLOCATION OF LEFT ANKLE JOINT, SUBSEQUENT ENCOUNTER: ICD-10-CM

## 2021-05-10 DIAGNOSIS — R60.0 EDEMA OF LOWER EXTREMITY: ICD-10-CM

## 2021-05-10 DIAGNOSIS — M25.572 ACUTE LEFT ANKLE PAIN: ICD-10-CM

## 2021-05-10 DIAGNOSIS — Z98.890 POST-OPERATIVE STATE: ICD-10-CM

## 2021-05-10 DIAGNOSIS — S82.832D OTHER CLOSED FRACTURE OF DISTAL END OF LEFT FIBULA WITH ROUTINE HEALING, SUBSEQUENT ENCOUNTER: Primary | ICD-10-CM

## 2021-05-10 PROCEDURE — 99024 POSTOP FOLLOW-UP VISIT: CPT | Performed by: PODIATRIST

## 2021-05-10 ASSESSMENT — ENCOUNTER SYMPTOMS
DIARRHEA: 0
COLOR CHANGE: 0
BACK PAIN: 0
NAUSEA: 0
SHORTNESS OF BREATH: 0

## 2021-05-10 NOTE — LETTER
[address] [address]  Phone: [phone]  Fax: [phone]    Luana Murphy        May 10, 2021     Patient: Coreen López   YOB: 1980   Date of Visit: 5/10/2021       To Whom It May Concern: It is my medical opinion that Nataly Gabriel may return to work on 5/11/2021 with the following restrictions: avoid excessive walking or standing . If you have any questions or concerns, please don't hesitate to call.     Sincerely,        Rosa Maria Da Silva DPM

## 2021-05-10 NOTE — PROGRESS NOTES
[facility]  Return Patient Progress Note    Subjective: Alex Larsen 35 y.o. female that presents for follow up evaluation of fracture to the left ankle. Chief Complaint   Patient presents with    Post-Op Check     Right Ankle     Patient's treatment thus far has included FWB with CAM walker. Pain is rated 0 out of 10 and is described as none. Patient has been following my prescribed course of therapy as instructed. Patient tripped over something at home and injured her left big toe. Patient states that she was not wearing her boot at the time. Patient states that she has pain to the left great toe with movement and this pain is a 2 out of 10. Review of Systems   Constitutional: Negative for activity change, appetite change, chills, diaphoresis, fatigue and fever. Respiratory: Negative for shortness of breath. Cardiovascular: Negative for leg swelling. Gastrointestinal: Negative for diarrhea and nausea. Endocrine: Negative for cold intolerance, heat intolerance and polyuria. Musculoskeletal: Positive for gait problem and joint swelling. Negative for arthralgias, back pain and myalgias. Skin: Negative for color change, pallor, rash and wound. Allergic/Immunologic: Negative for environmental allergies and food allergies. Neurological: Negative for dizziness, weakness, light-headedness and numbness. Hematological: Does not bruise/bleed easily. Psychiatric/Behavioral: Negative for behavioral problems, confusion and self-injury. The patient is not nervous/anxious. Objective: Clinical evaluation of the patient reveals adequate alignment to the left ankle. There remains a moderate amount of edema to the left ankle. There is only slight remaining pain with palpation to the medial and lateral aspects of the left ankle. Surgical incisions remain well healed. Active and passive range of motion of the left ankle is mildly painful, but no grinding is noted.  This pain has decreased since last visit. There is edema ecchymosis noted to the left hallux. There is no subluxation or dislocation noted to the left hallux. There is no pain with range of motion noted to the left hallux IPJ or MTPJ.  X-ray's taken: AP, Lateral, and Medial Oblique of the left ankle. Findings: There is adequate alignment with intact internal fixation to the tibia and fibula. There is adequate osseous union to all fractures today. Evaluation of the hallux on the lateral view does not show any fracture, subluxation, or dislocation. Assessment:    Diagnosis Orders   1. Other closed fracture of distal end of left fibula with routine healing, subsequent encounter  XR ANKLE LEFT (MIN 3 VIEWS)   2. Closed fracture of posterior malleolus of left tibia with routine healing, subsequent encounter  XR ANKLE LEFT (MIN 3 VIEWS)   3. Dislocation of left ankle joint, subsequent encounter  XR ANKLE LEFT (MIN 3 VIEWS)   4. Edema of lower extremity  XR ANKLE LEFT (MIN 3 VIEWS)   5. Acute left ankle pain  XR ANKLE LEFT (MIN 3 VIEWS)   6. Post-operative state  XR ANKLE LEFT (MIN 3 VIEWS)         Plan: 1. Clinical evaluation of the patient. 2. Patient informed that she has adequately healed and may return to regular shoe wear and weightbearing. Patient to use caution with activities until her leg and ankle strengthen. Patient encouraged to elevate and ice the left ankle still. Patient informed that her left hallux does not appear broken. 3. Return in about 3 weeks (around 5/31/2021) for post op evaluation left ankle fracture.    5/10/2021      Geraldine Freed DPM

## 2021-06-01 ENCOUNTER — OFFICE VISIT (OUTPATIENT)
Dept: PODIATRY | Age: 41
End: 2021-06-01
Payer: COMMERCIAL

## 2021-06-01 VITALS — WEIGHT: 213 LBS | HEIGHT: 70 IN | BODY MASS INDEX: 30.49 KG/M2

## 2021-06-01 DIAGNOSIS — S93.05XD DISLOCATION OF LEFT ANKLE JOINT, SUBSEQUENT ENCOUNTER: ICD-10-CM

## 2021-06-01 DIAGNOSIS — Z98.890 POST-OPERATIVE STATE: ICD-10-CM

## 2021-06-01 DIAGNOSIS — S82.832D OTHER CLOSED FRACTURE OF DISTAL END OF LEFT FIBULA WITH ROUTINE HEALING, SUBSEQUENT ENCOUNTER: Primary | ICD-10-CM

## 2021-06-01 DIAGNOSIS — S82.392D CLOSED FRACTURE OF POSTERIOR MALLEOLUS OF LEFT TIBIA WITH ROUTINE HEALING, SUBSEQUENT ENCOUNTER: ICD-10-CM

## 2021-06-01 DIAGNOSIS — M25.572 ACUTE LEFT ANKLE PAIN: ICD-10-CM

## 2021-06-01 DIAGNOSIS — R60.0 EDEMA OF LOWER EXTREMITY: ICD-10-CM

## 2021-06-01 PROCEDURE — L4350 ANKLE CONTROL ORTHO PRE OTS: HCPCS | Performed by: PODIATRIST

## 2021-06-01 PROCEDURE — 99213 OFFICE O/P EST LOW 20 MIN: CPT | Performed by: PODIATRIST

## 2021-06-01 ASSESSMENT — ENCOUNTER SYMPTOMS
COLOR CHANGE: 0
BACK PAIN: 0
SHORTNESS OF BREATH: 0
DIARRHEA: 0
NAUSEA: 0

## 2021-06-01 NOTE — PROGRESS NOTES
[facility]  Return Patient Progress Note    Subjective: Sendy Gayle 35 y.o. female that presents for follow up evaluation of ORIF left ankle fracture. Chief Complaint   Patient presents with    Post-Op Check     post op left, doing good overall     Nail Problem     thick toenails left foot     Patient's treatment thus far has included regular shoe wear and activity. Patient returned to work, but does need to wear the boot at times. Pain is rated 6 out of 10 and is described as intermittent and very infrequent. Patient has been following my prescribed course of therapy as instructed. Review of Systems   Constitutional: Negative for activity change, appetite change, chills, diaphoresis, fatigue and fever. Respiratory: Negative for shortness of breath. Cardiovascular: Negative for leg swelling. Gastrointestinal: Negative for diarrhea and nausea. Endocrine: Negative for cold intolerance, heat intolerance and polyuria. Musculoskeletal: Positive for gait problem and joint swelling. Negative for arthralgias, back pain and myalgias. Skin: Negative for color change, pallor, rash and wound. Allergic/Immunologic: Negative for environmental allergies and food allergies. Neurological: Negative for dizziness, weakness, light-headedness and numbness. Hematological: Does not bruise/bleed easily. Psychiatric/Behavioral: Negative for behavioral problems, confusion and self-injury. The patient is not nervous/anxious. Objective: Clinical evaluation of the patient reveals continued adequate alignment to the left ankle. There is only mild edema to the left ankle. There is only slight remaining pain with palpation to the medial and lateral aspects of the left ankle. Surgical incisions remain well healed. Active and passive range of motion of the left ankle is not painful today and no grinding is noted. The right ankle is stable upon manipulation.     Assessment:    Diagnosis Orders 6/1/2021      Janny Mendoza DPM

## 2021-06-29 ENCOUNTER — OFFICE VISIT (OUTPATIENT)
Dept: PODIATRY | Age: 41
End: 2021-06-29
Payer: COMMERCIAL

## 2021-06-29 VITALS — HEIGHT: 70 IN | BODY MASS INDEX: 30.49 KG/M2 | WEIGHT: 213 LBS

## 2021-06-29 DIAGNOSIS — R60.0 EDEMA OF LOWER EXTREMITY: ICD-10-CM

## 2021-06-29 DIAGNOSIS — S93.05XD DISLOCATION OF LEFT ANKLE JOINT, SUBSEQUENT ENCOUNTER: ICD-10-CM

## 2021-06-29 DIAGNOSIS — S82.392D CLOSED FRACTURE OF POSTERIOR MALLEOLUS OF LEFT TIBIA WITH ROUTINE HEALING, SUBSEQUENT ENCOUNTER: ICD-10-CM

## 2021-06-29 DIAGNOSIS — M25.572 ACUTE LEFT ANKLE PAIN: ICD-10-CM

## 2021-06-29 DIAGNOSIS — S82.832D OTHER CLOSED FRACTURE OF DISTAL END OF LEFT FIBULA WITH ROUTINE HEALING, SUBSEQUENT ENCOUNTER: Primary | ICD-10-CM

## 2021-06-29 PROCEDURE — 4004F PT TOBACCO SCREEN RCVD TLK: CPT | Performed by: PODIATRIST

## 2021-06-29 PROCEDURE — G8417 CALC BMI ABV UP PARAM F/U: HCPCS | Performed by: PODIATRIST

## 2021-06-29 PROCEDURE — 99213 OFFICE O/P EST LOW 20 MIN: CPT | Performed by: PODIATRIST

## 2021-06-29 PROCEDURE — G8427 DOCREV CUR MEDS BY ELIG CLIN: HCPCS | Performed by: PODIATRIST

## 2021-06-29 ASSESSMENT — ENCOUNTER SYMPTOMS
SHORTNESS OF BREATH: 0
DIARRHEA: 0
NAUSEA: 0
BACK PAIN: 0
COLOR CHANGE: 0

## 2021-06-29 NOTE — PROGRESS NOTES
[facility]  Return Patient Progress Note    Subjective: Michael Alexander 35 y.o. female that presents for follow up evaluation of ORIF left ankle fracture. Chief Complaint   Patient presents with    Follow-up     Left ankle, 1month f/u    Ankle Pain     Left ankle     Patient's treatment thus far has included regular shoe wear with ankle brace. Pain is rated 8 out of 10 and is described as intermittent and only at the end of her work day because of the swelling. Patient has been following my prescribed course of therapy as instructed. Review of Systems   Constitutional: Negative for activity change, appetite change, chills, diaphoresis, fatigue and fever. Respiratory: Negative for shortness of breath. Cardiovascular: Negative for leg swelling. Gastrointestinal: Negative for diarrhea and nausea. Endocrine: Negative for cold intolerance, heat intolerance and polyuria. Musculoskeletal: Positive for gait problem and joint swelling. Negative for arthralgias, back pain and myalgias. Skin: Negative for color change, pallor, rash and wound. Allergic/Immunologic: Negative for environmental allergies and food allergies. Neurological: Negative for dizziness, weakness, light-headedness and numbness. Hematological: Does not bruise/bleed easily. Psychiatric/Behavioral: Negative for behavioral problems, confusion and self-injury. The patient is not nervous/anxious. Objective: Clinical evaluation of the patient reveals continued adequate alignment to the left ankle. There remains only mild edema to the left ankle. There is no remaining pain with palpation to the medial or lateral aspects of the left ankle. Surgical incisions remain well healed. Active and passive range of motion of the left ankle is not painful today and no grinding is noted. The right ankle is stable upon manipulation. Muscle strength is +5/5 to all four muscle groups of the left lower extremity.     Assessment:

## 2024-10-02 NOTE — ANESTHESIA POSTPROCEDURE EVALUATION
POST- ANESTHESIA EVALUATION       Pt Name: Emily Dooley  MRN: [id number]  YOB: 1980  Date of evaluation: 2/8/2021  Time:  8:28 PM      /83   Pulse 92   Temp 97.8 °F (36.6 °C) (Oral)   Resp 16   Ht 5' 9\" (1.753 m)   Wt 223 lb (101.2 kg)   SpO2 98%   BMI 32.93 kg/m²      Consciousness Level  Awake  Cardiopulmonary Status  Stable  Pain Adequately Treated YES  Nausea / Vomiting  NO  Adequate Hydration  YES  Anesthesia Related Complications NONE      Electronically signed by Matt Winter MD on 2/8/2021 at 8:28 PM       Department of Anesthesiology  Postprocedure Note    Patient: Emily Dooley  MRN: [id number]  YOB: 1980  Date of evaluation: 2/8/2021  Time:  8:28 PM     Procedure Summary     Date: 02/08/21 Room / Location: [address] [place] [address] / [address] [facility]     Anesthesia Start: [address] Anesthesia Stop: 1825    Procedure: ANKLE EXTERNAL FIXATOR APPLICATION DELTA FRAME (Left Ankle) Diagnosis: (UNSTABLE DISLOCATED LEFT ANKLE FRACTURE)    Surgeons: Kristin Perez DPM Responsible Provider: Matt Winter MD    Anesthesia Type: general ASA Status: 2          Anesthesia Type: general    Austyn Phase I: Austyn Score: 10    Austyn Phase II:      Last vitals: Reviewed and per EMR flowsheets.        Anesthesia Post Evaluation
No

## (undated) DEVICE — BLANKET WRM W29.9XL79.1IN UP BODY FORC AIR MISTRAL-AIR

## (undated) DEVICE — PAD,ABDOMINAL,8"X7.5",ST,LF,20/BX: Brand: MEDLINE INDUSTRIES, INC.

## (undated) DEVICE — SUTURE VCRL + SZ 2-0 L27IN ABSRB UD CT-2 L26MM 1/2 CIR TAPR VCP269H

## (undated) DEVICE — CONNECTING ROD: Brand: HOFFMANN

## (undated) DEVICE — ROD TO ROD COUPLING DIA 5,8,11MM: Brand: HOFFMANN

## (undated) DEVICE — LOCKING SCREW
Type: IMPLANTABLE DEVICE | Site: ANKLE | Status: NON-FUNCTIONAL
Brand: VARIAX

## (undated) DEVICE — GLOVE SURG SZ 85 L12IN FNGR ORTHO 126MIL CRM LTX FREE

## (undated) DEVICE — STOCKINETTE,SINGLE PLY,4X48,STERILE: Brand: MEDLINE

## (undated) DEVICE — GAUZE,SPONGE,FLUFF,6"X6.75",STRL,5/TRAY: Brand: MEDLINE

## (undated) DEVICE — C-ARMOR C-ARM EQUIPMENT COVERS CLEAR STERILE UNIVERSAL FIT 12 PER CASE: Brand: C-ARMOR

## (undated) DEVICE — CANNULATED DRILL BIT: Brand: MINI

## (undated) DEVICE — MERCY HEALTH ST CHARLES: Brand: MEDLINE INDUSTRIES, INC.

## (undated) DEVICE — 3.0MM DEPTH GAUGE/ COUNTERSINK: Brand: MINI

## (undated) DEVICE — TRANSFIXING PIN: Brand: APEX

## (undated) DEVICE — SUTURE ETHLN SZ 3-0 L18IN NONABSORBABLE BLK FS-1 L24MM 3/8 663H

## (undated) DEVICE — COVER,TABLE,60X90,STERILE: Brand: MEDLINE

## (undated) DEVICE — Device

## (undated) DEVICE — 3M™ IOBAN™ 2 ANTIMICROBIAL INCISE DRAPE 6650EZ: Brand: IOBAN™ 2

## (undated) DEVICE — DRILL BIT, AO DIA2.6MM X 135MM, SCALED: Brand: VARIAX

## (undated) DEVICE — SPONGE LAP W18XL18IN WHT COT 4 PLY FLD STRUNG RADPQ DISP ST

## (undated) DEVICE — SPLINT ORTH W5XL30IN WHT FBRGLS 1 SIDE FELT PD CNFRM LO

## (undated) DEVICE — DRESSING GZ W3XL16IN CELOS ACETT OIL EMUL N ADH

## (undated) DEVICE — DRESSING PETRO W3XL8IN OIL EMUL N ADH GZ KNIT IMPREG CELOS

## (undated) DEVICE — COVER,C-ARM,41X74: Brand: MEDLINE

## (undated) DEVICE — DRILL BIT, AO, SCALED: Brand: VARIAX

## (undated) DEVICE — NON-THREADED KWIRE
Type: IMPLANTABLE DEVICE | Site: ANKLE | Status: NON-FUNCTIONAL
Brand: MINI
Removed: 2021-02-19

## (undated) DEVICE — COVER LT HNDL BLU PLAS

## (undated) DEVICE — SELF-DRILLING HALF PIN: Brand: APEX

## (undated) DEVICE — BONE SCREW
Type: IMPLANTABLE DEVICE | Site: ANKLE | Status: NON-FUNCTIONAL
Brand: VARIAX

## (undated) DEVICE — DISPOSABLE END CAPS (BLUE) APEX   DIA 5MM: Brand: APEX

## (undated) DEVICE — DRILL, AO, SCALED: Brand: VARIAX

## (undated) DEVICE — HOLDING PIN: Brand: ANCHORAGE

## (undated) DEVICE — SUTURE VCRL + SZ 3-0 L27IN ABSRB UD L26MM SH 1/2 CIR VCP416H

## (undated) DEVICE — SINGLE PORT MANIFOLD: Brand: NEPTUNE 2

## (undated) DEVICE — BANDAGE COMPR W2INXL5YD BGE POLY COT E RECOVERABLE BRTH W/

## (undated) DEVICE — INTENDED FOR TISSUE SEPARATION, AND OTHER PROCEDURES THAT REQUIRE A SHARP SURGICAL BLADE TO PUNCTURE OR CUT.: Brand: BARD-PARKER ® CARBON RIB-BACK BLADES

## (undated) DEVICE — 5 HOLE PIN CLAMP, 2 POSTS, 30 DEGREE 3 DIA 4,5,6MM: Brand: HOFFMANN